# Patient Record
Sex: FEMALE | Race: WHITE | NOT HISPANIC OR LATINO | Employment: FULL TIME | URBAN - METROPOLITAN AREA
[De-identification: names, ages, dates, MRNs, and addresses within clinical notes are randomized per-mention and may not be internally consistent; named-entity substitution may affect disease eponyms.]

---

## 2018-01-23 ENCOUNTER — HOSPITAL ENCOUNTER (EMERGENCY)
Facility: CLINIC | Age: 43
Discharge: HOME | End: 2018-01-24
Attending: EMERGENCY MEDICINE | Admitting: EMERGENCY MEDICINE
Payer: OTHER GOVERNMENT

## 2018-01-23 ENCOUNTER — APPOINTMENT (OUTPATIENT)
Dept: GENERAL RADIOLOGY | Facility: CLINIC | Age: 43
End: 2018-01-23
Attending: EMERGENCY MEDICINE
Payer: OTHER GOVERNMENT

## 2018-01-23 VITALS
OXYGEN SATURATION: 99 % | TEMPERATURE: 98.1 F | HEART RATE: 85 BPM | SYSTOLIC BLOOD PRESSURE: 137 MMHG | DIASTOLIC BLOOD PRESSURE: 100 MMHG

## 2018-01-23 DIAGNOSIS — R07.9 CHEST PAIN, UNSPECIFIED TYPE: ICD-10-CM

## 2018-01-23 LAB
ERYTHROCYTE [DISTWIDTH] IN BLOOD BY AUTOMATED COUNT: 12.5 % (ref 10–15)
HCT VFR BLD AUTO: 36.7 % (ref 35–47)
HGB BLD-MCNC: 12.9 G/DL (ref 11.7–15.7)
MCH RBC QN AUTO: 31.2 PG (ref 26.5–33)
MCHC RBC AUTO-ENTMCNC: 35.1 G/DL (ref 31.5–36.5)
MCV RBC AUTO: 89 FL (ref 78–100)
PLATELET # BLD AUTO: 216 10E9/L (ref 150–450)
RBC # BLD AUTO: 4.14 10E12/L (ref 3.8–5.2)
WBC # BLD AUTO: 7.1 10E9/L (ref 4–11)

## 2018-01-23 PROCEDURE — 84484 ASSAY OF TROPONIN QUANT: CPT | Performed by: EMERGENCY MEDICINE

## 2018-01-23 PROCEDURE — 93005 ELECTROCARDIOGRAM TRACING: CPT

## 2018-01-23 PROCEDURE — 96374 THER/PROPH/DIAG INJ IV PUSH: CPT

## 2018-01-23 PROCEDURE — 84703 CHORIONIC GONADOTROPIN ASSAY: CPT | Performed by: EMERGENCY MEDICINE

## 2018-01-23 PROCEDURE — 25000128 H RX IP 250 OP 636: Performed by: EMERGENCY MEDICINE

## 2018-01-23 PROCEDURE — 85027 COMPLETE CBC AUTOMATED: CPT | Performed by: EMERGENCY MEDICINE

## 2018-01-23 PROCEDURE — 71046 X-RAY EXAM CHEST 2 VIEWS: CPT

## 2018-01-23 PROCEDURE — 99285 EMERGENCY DEPT VISIT HI MDM: CPT | Mod: 25

## 2018-01-23 PROCEDURE — 80048 BASIC METABOLIC PNL TOTAL CA: CPT | Performed by: EMERGENCY MEDICINE

## 2018-01-23 RX ORDER — KETOROLAC TROMETHAMINE 15 MG/ML
15 INJECTION, SOLUTION INTRAMUSCULAR; INTRAVENOUS ONCE
Status: COMPLETED | OUTPATIENT
Start: 2018-01-23 | End: 2018-01-23

## 2018-01-23 RX ADMIN — KETOROLAC TROMETHAMINE 15 MG: 15 INJECTION, SOLUTION INTRAMUSCULAR; INTRAVENOUS at 23:52

## 2018-01-23 ASSESSMENT — ENCOUNTER SYMPTOMS
SHORTNESS OF BREATH: 0
NAUSEA: 1
VOMITING: 0

## 2018-01-23 NOTE — ED AVS SNAPSHOT
[facility] Emergency Department    [address] [place] [address]    Phone:  [phone]    Fax:  [phone]                                       Di Garcia   MRN: [id number]    Department:  [facility] Emergency Department   Date of Visit:  1/23/2018           Patient Information     Date Of Birth          1975        Your diagnoses for this visit were:     Chest pain, unspecified type        You were seen by Nick Grace MD.      Follow-up Information     Follow up with Russel Bearden Schedule an appointment as soon as possible for a visit in 2 days.    Specialty:  Internal Medicine    Contact information:    JOSHUA SALAZAR  [address] [facility] [address]  [phone]          Discharge Instructions       Discharge Instructions  Chest Pain    You have been seen today for chest pain or discomfort.  At this time, your provider has found no signs that your chest pain is due to a serious or life-threatening condition, (or you have declined more testing and/or admission to the hospital). However, sometimes there is a serious problem that does not show up right away. Your evaluation today may not be complete and you may need further testing and evaluation.     Generally, every Emergency Department visit should have a follow-up clinic visit with either a primary or a specialty clinic/provider. Please follow-up as instructed by your emergency provider today.  Return to the Emergency Department if:    Your chest pain changes, gets worse, starts to happen more often, or comes with less activity.    You are newly short of breath.    You get very weak or tired.    You pass out or faint.    You have any new symptoms, like fever, cough, numb legs, or you cough up blood.    You have anything else that worries you.    Until you follow-up with your regular provider, please do the following:    Take one aspirin daily unless you have an allergy or  are told not to by your provider.    If a stress test appointment has been made, go to the appointment.    If you have questions, contact your regular provider.    Follow-up with your regular provider/clinic as directed; this is very important.    If you were given a prescription for medicine here today, be sure to read all of the information (including the package insert) that comes with your prescription.  This will include important information about the medicine, its side effects, and any warnings that you need to know about.  The pharmacist who fills the prescription can provide more information and answer questions you may have about the medicine.  If you have questions or concerns that the pharmacist cannot address, please call or return to the Emergency Department.       Remember that you can always come back to the Emergency Department if you are not able to see your regular provider in the amount of time listed above, if you get any new symptoms, or if there is anything that worries you.      24 Hour Appointment Hotline       To make an appointment at any [facility], call [phone] ([phone]). If you don't have a family doctor or clinic, we will help you find one. [facility] clinics are conveniently located to serve the needs of you and your family.             Review of your medicines      Notice     You have not been prescribed any medications.            Procedures and tests performed during your visit     Basic metabolic panel (BMP)    CBC (platelets, no diff)    Chest XR,  PA & LAT    EKG 12 lead    HCG QUALitative pregnancy (blood)    Peripheral IV catheter    Troponin I      Orders Needing Specimen Collection     None      Pending Results     Date and Time Order Name Status Description    1/23/2018 2330 Chest XR,  PA & LAT Preliminary     1/23/2018 2326 EKG 12 lead Preliminary             Pending Culture Results     No orders found for last 3 day(s).            Pending Results  Instructions     If you had any lab results that were not finalized at the time of your Discharge, you can call the ED Lab Result RN at [phone]. You will be contacted by this team for any positive Lab results or changes in treatment. The nurses are available 7 days a week from 10A to 6:30P.  You can leave a message 24 hours per day and they will return your call.        Test Results From Your Hospital Stay        1/23/2018 11:56 PM      Component Results     Component Value Ref Range & Units Status    WBC 7.1 4.0 - 11.0 10e9/L Final    RBC Count 4.14 3.8 - 5.2 10e12/L Final    Hemoglobin 12.9 11.7 - 15.7 g/dL Final    Hematocrit 36.7 35.0 - 47.0 % Final    MCV 89 78 - 100 fl Final    MCH 31.2 26.5 - 33.0 pg Final    MCHC 35.1 31.5 - 36.5 g/dL Final    RDW 12.5 10.0 - 15.0 % Final    Platelet Count 216 150 - 450 10e9/L Final         1/24/2018 12:14 AM      Component Results     Component Value Ref Range & Units Status    Sodium 140 133 - 144 mmol/L Final    Potassium 3.5 3.4 - 5.3 mmol/L Final    Chloride 108 94 - 109 mmol/L Final    Carbon Dioxide 27 20 - 32 mmol/L Final    Anion Gap 5 3 - 14 mmol/L Final    Glucose 109 (H) 70 - 99 mg/dL Final    Urea Nitrogen 14 7 - 30 mg/dL Final    Creatinine 0.69 0.52 - 1.04 mg/dL Final    GFR Estimate >90 >60 mL/min/1.7m2 Final    Non  GFR Calc    GFR Estimate If Black >90 >60 mL/min/1.7m2 Final    African American GFR Calc    Calcium 8.3 (L) 8.5 - 10.1 mg/dL Final         1/24/2018 12:14 AM      Component Results     Component Value Ref Range & Units Status    Troponin I ES <0.015 0.000 - 0.045 ug/L Final    The 99th percentile for upper reference range is 0.045 ug/L.  Troponin values   in the range of 0.045 - 0.120 ug/L may be associated with risks of adverse   clinical events.           1/24/2018 12:11 AM      Component Results     Component Value Ref Range & Units Status    HCG Qualitative Serum Negative NEG^Negative Final    This test is for screening  purposes.  Results should be interpreted along with   the clinical picture.  Confirmation testing is available if warranted by   ordering [id number], HCG Quantitative Pregnancy.           1/24/2018 12:30 AM      Narrative     XR CHEST 2 VW  1/24/2018 12:04 AM     INDICATION: Chest pain.    COMPARISON: 3/9/2016.        Impression     IMPRESSION: No infiltrates or other acute findings. Heart size is  within normal limits.                Clinical Quality Measure: Blood Pressure Screening     Your blood pressure was checked while you were in the emergency department today. The last reading we obtained was  BP: (!) 137/100 . Please read the guidelines below about what these numbers mean and what you should do about them.  If your systolic blood pressure (the top number) is less than 120 and your diastolic blood pressure (the bottom number) is less than 80, then your blood pressure is normal. There is nothing more that you need to do about it.  If your systolic blood pressure (the top number) is 120-139 or your diastolic blood pressure (the bottom number) is 80-89, your blood pressure may be higher than it should be. You should have your blood pressure rechecked within a year by a primary care provider.  If your systolic blood pressure (the top number) is 140 or greater or your diastolic blood pressure (the bottom number) is 90 or greater, you may have high blood pressure. High blood pressure is treatable, but if left untreated over time it can put you at risk for heart attack, stroke, or kidney failure. You should have your blood pressure rechecked by a primary care provider within the next 4 weeks.  If your provider in the emergency department today gave you specific instructions to follow-up with your doctor or provider even sooner than that, you should follow that instruction and not wait for up to 4 weeks for your follow-up visit.        Thank you for choosing [facility]       Thank you for choosing [facility] for your care.  "Our goal is always to provide you with excellent care. Hearing back from our patients is one way we can continue to improve our services. Please take a few minutes to complete the written survey that you may receive in the mail after you visit with us. Thank you!        [employer] Information     [employer] lets you send messages to your doctor, view your test results, renew your prescriptions, schedule appointments and more. To sign up, go to [email or web address] . Click on \"Log in\" on the left side of the screen, which will take you to the Welcome page. Then click on \"Sign up Now\" on the right side of the page.     You will be asked to enter the access code listed below, as well as some personal information. Please follow the directions to create your username and password.     Your access code is: [email or web address]  Expires: 2018 12:39 AM     Your access code will  in 90 days. If you need help or a new code, please call your [facility] clinic or [phone].        Care EveryWhere ID     This is your Care EveryWhere ID. This could be used by other organizations to access your [facility] medical records  [id number]        Equal Access to Services     JOHNNA VERAS : Alejo Chatman, tye agustin, nicho call, parviz fishman. So [facility] [phone].    ATENCIÓN: Si habla español, tiene a redd disposición servicios gratuitos de asistencia lingüística. Osmel al [phone].    We comply with applicable federal civil rights laws and [place] laws. We do not discriminate on the basis of race, color, national origin, age, disability, sex, sexual orientation, or gender identity.            After Visit Summary       This is your record. Keep this with you and show to your community pharmacist(s) and doctor(s) at your next visit.                  "

## 2018-01-23 NOTE — ED AVS SNAPSHOT
[facility] Emergency Department    [address]    [facility] [address]    Phone:  [phone]    Fax:  [phone]                                       Di Garcia   MRN: [id number]    Department:  [facility] Emergency Department   Date of Visit:  1/23/2018           After Visit Summary Signature Page     I have received my discharge instructions, and my questions have been answered. I have discussed any challenges I see with this plan with the nurse or doctor.    ..........................................................................................................................................  Patient/Patient Representative Signature      ..........................................................................................................................................  Patient Representative Print Name and Relationship to Patient    ..................................................               ................................................  Date                                            Time    ..........................................................................................................................................  Reviewed by Signature/Title    ...................................................              ..............................................  Date                                                            Time

## 2018-01-24 LAB
ANION GAP SERPL CALCULATED.3IONS-SCNC: 5 MMOL/L (ref 3–14)
BUN SERPL-MCNC: 14 MG/DL (ref 7–30)
CALCIUM SERPL-MCNC: 8.3 MG/DL (ref 8.5–10.1)
CHLORIDE SERPL-SCNC: 108 MMOL/L (ref 94–109)
CO2 SERPL-SCNC: 27 MMOL/L (ref 20–32)
CREAT SERPL-MCNC: 0.69 MG/DL (ref 0.52–1.04)
GFR SERPL CREATININE-BSD FRML MDRD: >90 ML/MIN/1.7M2
GLUCOSE SERPL-MCNC: 109 MG/DL (ref 70–99)
HCG SERPL QL: NEGATIVE
INTERPRETATION ECG - MUSE: NORMAL
POTASSIUM SERPL-SCNC: 3.5 MMOL/L (ref 3.4–5.3)
SODIUM SERPL-SCNC: 140 MMOL/L (ref 133–144)
TROPONIN I SERPL-MCNC: <0.015 UG/L (ref 0–0.04)

## 2018-01-24 NOTE — ED NOTES
"Pt presents to ED with complaints of chest pain. Pt states she has been having \"twinges\" over the last few days but in the last few hours it has turned into a midsternal pressure with twinges to her left chest. Pt states it's worse with going up and down stairs. Hx of anxiety and pt took ativan about 45 minutes ago with no improvement. ABCs intact. A&Ox4.   "

## 2018-01-24 NOTE — ED PROVIDER NOTES
History     Chief Complaint:  Chest Pain    HPI   Di Garcia is a 42 year old female with a history of anxiety who presents to the emergency department for evaluation of chest pain. The patient reports that she was generally feeling well today, though this evening at approximately 2030 while watching TV, she developed substernal, reproducible chest discomfort. This discomfort was worsened by positional changes and while going up the stairs, though felt somewhat alleviated with a heating pad. The patient was concerned that this may represent anxiety and took a tablet of Ativan, with little relief. Her persistent discomfort since onset was concerning to her and prompted her ED visit.     The patient feels slightly nauseated as well with these symptoms, though has not vomited. She denies any recent shortness of breath, hemoptysis, or personal history of malignancy. The patient's last menstrual period was last week, which was normal timing for her, though was slightly heavier than normal.     Cardiac/PE/DVT Risk Factors:  The patient has no history of hypertension, hyperlipidemia, diabetes, or smoking. She reports no family history of CAD or MI at an early age. The patient denies any personal or familial history of PE, DVT, or clotting disorder. The patient reports no recent travel, surgery, or other immobilizations.     Allergies:  NKDA     Medications:    Prozac    Past Medical History:    Depression  Anxiety    Past Surgical History:    [place] teeth removal   Family / Social History:    No past pertinent family history.    Social History:  Presents with her son.   Negative for tobacco use.  Positive for alcohol use, rarely.   Marital Status:  Single [1]    Review of Systems   Respiratory: Negative for shortness of breath.    Cardiovascular: Positive for chest pain.   Gastrointestinal: Positive for nausea. Negative for vomiting.   All other systems reviewed and are negative.    Physical Exam     Patient  Vitals for the past 24 hrs:   BP Temp Temp src Pulse Heart Rate SpO2   01/23/18 2321 (!) 137/100 98.1  F (36.7  C) Oral 85 85 99 %       Physical Exam    General:   Pleasant, age appropriate.  HEENT:    Oropharynx is moist, without lesions or trismus.  Eyes:    Conjunctiva normal  Neck:    Supple, no meningismus.     CV:     Regular rate and rhythm.      No murmurs, rubs or gallops.       No JVD or unilateral leg swelling.       2+ radial pulses bilateral.       No lower extremity edema.  PULM:    Clear to auscultation bilateral.       No respiratory distress.      Good air exchange.     No rales or wheezing.     Chest wall is tender to touch and reproduces her pain.  ABD:    Soft, non-tender, non-distended.       No pulsatile masses.       No rebound, guarding or rigidity.  MSK:     No gross deformity to all four extremities.   LYMPH:   No cervical lymphadenopathy.  NEURO:   Alert. Good muscle tone, no atrophy.  Skin:    Warm, dry and intact.    Psych:    Mood is good and affect is appropriate.      HEART SCORE    History:   Highly suspicious          Moderately suspicious         Slightly suspicious     0    ECG:   Significant ST depression         Non-specific repolarization abnormality       Normal       0    Age:   > 65            45-65            < 45       0    Risk Factors:  3 or more          1-2            No risk factors      0    Troponin:   > 3x normal limit     1-3x normal limit     < normal limit      0    Total:           0      Emergency Department Course   ECG:  Indication: Chest Pain  Time: 2323  Vent. Rate 85 bpm. DE interval 136. QRS duration 90. QT/QTc 378/449. P-R-T axis 46 -5 34.  Normal sinus rhythm. Normal ECG. No significant change compared to EKG dated 3/9/2016. Read time: 2330    Imaging:  Radiographic findings were communicated with the patient who voiced understanding of the findings.    XR Chest 2 views:   No infiltrates or other acute findings. Heart size is  within normal limits. As  per radiology.     Laboratory:  CBC: WBC: 7.1, HGB: 12.9, PLT: 216  BMP: Glucose 109 (H), Calcium 8.3 (L), o/w WNL (Creatinine: 0.69)    2344 Troponin: <0.015    HCG qualitative pregnancy (blood): Negative     Interventions:  2352 Toradol, 15 mg, IV injection    Emergency Department Course:  Nursing notes and vitals reviewed. 2324 I performed an exam of the patient as documented above.     EKG obtained in the ED, see results above.     IV inserted. Medicine administered as documented above. Blood drawn. This was sent to the lab for further testing, results above.    The patient was sent for a Chest XR while in the emergency department, findings above.     0034 I rechecked the patient and discussed the results of her workup thus far.     Findings and plan explained to the Patient. Patient discharged home with instructions regarding supportive care, medications, and reasons to return. The importance of close follow-up was reviewed.     I personally reviewed the laboratory results with the Patient and answered all related questions prior to discharge.     Impression & Plan      Medical Decision Making:  Patient was seen in the ED with complaints of chest pain.  My clinical gestalt for underlying coronary artery disease is low.  EKG reveals no concerning findings for ischemia, arrhythmia, myocarditis or pericarditis.  Troponin is within normal limits.  The patient has a HEART score of 0 placing their risk of major adverse cardiac event at 1.7% at 6 weeks.  The patient feels comfortable with discharge home in which I have recommended urgent follow up with primary care physician in 2-3 days.    I also considered alternative forms of pathology as a cause of the patient's chest pain today.  Patient as no concerning risk factors for pulmonary embolus, is very low risk based on clinical gestalt and PERC negative thus I did not recommend D-dimer testing or CT scan of the chest.  History and exam is not consistent with aortic  aneurysm, dissection or esophageal rupture to necessitate CT scan.  Given the reproducible chest wall tenderness that is most consistent with musculoskeletal source such as costochondritis.  Patient understands the need for immediate return for increased pain, dyspnea, syncope, failure to improve, worsening symptoms or any other concerns for further evaluation.    Diagnosis:    ICD-10-CM   1. Chest pain, unspecified type R07.9       Disposition:  discharged to home    IThais, am serving as a scribe on 1/23/2018 at 11:24 PM to personally document services performed by Nick Grace MD based on my observations and the provider's statements to me.     Thais Cardoza  1/23/2018   [facility] EMERGENCY DEPARTMENT       Nick Grace MD  01/24/18 0049

## 2018-12-30 ENCOUNTER — APPOINTMENT (OUTPATIENT)
Dept: GENERAL RADIOLOGY | Facility: CLINIC | Age: 43
End: 2018-12-30
Attending: EMERGENCY MEDICINE
Payer: OTHER GOVERNMENT

## 2018-12-30 ENCOUNTER — HOSPITAL ENCOUNTER (EMERGENCY)
Facility: CLINIC | Age: 43
Discharge: HOME | End: 2018-12-30
Attending: EMERGENCY MEDICINE | Admitting: EMERGENCY MEDICINE
Payer: OTHER GOVERNMENT

## 2018-12-30 VITALS
BODY MASS INDEX: 30.73 KG/M2 | HEIGHT: 64 IN | SYSTOLIC BLOOD PRESSURE: 119 MMHG | TEMPERATURE: 99 F | HEART RATE: 81 BPM | OXYGEN SATURATION: 98 % | DIASTOLIC BLOOD PRESSURE: 79 MMHG | RESPIRATION RATE: 16 BRPM | WEIGHT: 180 LBS

## 2018-12-30 DIAGNOSIS — R07.89 CHEST WALL PAIN: ICD-10-CM

## 2018-12-30 DIAGNOSIS — R07.9 ACUTE CHEST PAIN: ICD-10-CM

## 2018-12-30 LAB
ANION GAP SERPL CALCULATED.3IONS-SCNC: 8 MMOL/L (ref 3–14)
BASOPHILS # BLD AUTO: 0 10E9/L (ref 0–0.2)
BASOPHILS NFR BLD AUTO: 0.2 %
BUN SERPL-MCNC: 10 MG/DL (ref 7–30)
CALCIUM SERPL-MCNC: 8.1 MG/DL (ref 8.5–10.1)
CHLORIDE SERPL-SCNC: 108 MMOL/L (ref 94–109)
CO2 SERPL-SCNC: 22 MMOL/L (ref 20–32)
CREAT SERPL-MCNC: 0.56 MG/DL (ref 0.52–1.04)
D DIMER PPP FEU-MCNC: 0.3 UG/ML FEU (ref 0–0.5)
DIFFERENTIAL METHOD BLD: NORMAL
EOSINOPHIL # BLD AUTO: 0.1 10E9/L (ref 0–0.7)
EOSINOPHIL NFR BLD AUTO: 0.6 %
ERYTHROCYTE [DISTWIDTH] IN BLOOD BY AUTOMATED COUNT: 13.2 % (ref 10–15)
GFR SERPL CREATININE-BSD FRML MDRD: >90 ML/MIN/{1.73_M2}
GLUCOSE SERPL-MCNC: 98 MG/DL (ref 70–99)
HCT VFR BLD AUTO: 37.2 % (ref 35–47)
HGB BLD-MCNC: 13.1 G/DL (ref 11.7–15.7)
IMM GRANULOCYTES # BLD: 0.1 10E9/L (ref 0–0.4)
IMM GRANULOCYTES NFR BLD: 0.6 %
INTERPRETATION ECG - MUSE: NORMAL
LYMPHOCYTES # BLD AUTO: 1.5 10E9/L (ref 0.8–5.3)
LYMPHOCYTES NFR BLD AUTO: 17.8 %
MCH RBC QN AUTO: 30.6 PG (ref 26.5–33)
MCHC RBC AUTO-ENTMCNC: 35.2 G/DL (ref 31.5–36.5)
MCV RBC AUTO: 87 FL (ref 78–100)
MONOCYTES # BLD AUTO: 0.4 10E9/L (ref 0–1.3)
MONOCYTES NFR BLD AUTO: 4.7 %
NEUTROPHILS # BLD AUTO: 6.5 10E9/L (ref 1.6–8.3)
NEUTROPHILS NFR BLD AUTO: 76.1 %
NRBC # BLD AUTO: 0 10*3/UL
NRBC BLD AUTO-RTO: 0 /100
PLATELET # BLD AUTO: 203 10E9/L (ref 150–450)
POTASSIUM SERPL-SCNC: 3.9 MMOL/L (ref 3.4–5.3)
RBC # BLD AUTO: 4.28 10E12/L (ref 3.8–5.2)
SODIUM SERPL-SCNC: 138 MMOL/L (ref 133–144)
TROPONIN I SERPL-MCNC: <0.015 UG/L (ref 0–0.04)
WBC # BLD AUTO: 8.5 10E9/L (ref 4–11)

## 2018-12-30 PROCEDURE — 93005 ELECTROCARDIOGRAM TRACING: CPT

## 2018-12-30 PROCEDURE — 96374 THER/PROPH/DIAG INJ IV PUSH: CPT

## 2018-12-30 PROCEDURE — 99285 EMERGENCY DEPT VISIT HI MDM: CPT | Mod: 25

## 2018-12-30 PROCEDURE — 25000128 H RX IP 250 OP 636: Performed by: EMERGENCY MEDICINE

## 2018-12-30 PROCEDURE — 85379 FIBRIN DEGRADATION QUANT: CPT | Performed by: EMERGENCY MEDICINE

## 2018-12-30 PROCEDURE — 80048 BASIC METABOLIC PNL TOTAL CA: CPT | Performed by: EMERGENCY MEDICINE

## 2018-12-30 PROCEDURE — 71046 X-RAY EXAM CHEST 2 VIEWS: CPT

## 2018-12-30 PROCEDURE — 85025 COMPLETE CBC W/AUTO DIFF WBC: CPT | Performed by: EMERGENCY MEDICINE

## 2018-12-30 PROCEDURE — 84484 ASSAY OF TROPONIN QUANT: CPT | Performed by: EMERGENCY MEDICINE

## 2018-12-30 RX ORDER — KETOROLAC TROMETHAMINE 15 MG/ML
15 INJECTION, SOLUTION INTRAMUSCULAR; INTRAVENOUS ONCE
Status: COMPLETED | OUTPATIENT
Start: 2018-12-30 | End: 2018-12-30

## 2018-12-30 RX ADMIN — KETOROLAC TROMETHAMINE 15 MG: 15 INJECTION, SOLUTION INTRAMUSCULAR; INTRAVENOUS at 14:40

## 2018-12-30 ASSESSMENT — ENCOUNTER SYMPTOMS
FEVER: 0
CHILLS: 0
NERVOUS/ANXIOUS: 1

## 2018-12-30 ASSESSMENT — MIFFLIN-ST. JEOR: SCORE: 1446.47

## 2018-12-30 NOTE — ED AVS SNAPSHOT
Emergency Department  [address] [place] [address]  Phone:  [phone]  Fax:  [phone]                                    Di Garcia   MRN: [id number]    Department:   Emergency Department   Date of Visit:  12/30/2018           After Visit Summary Signature Page    I have received my discharge instructions, and my questions have been answered. I have discussed any challenges I see with this plan with the nurse or doctor.    ..........................................................................................................................................  Patient/Patient Representative Signature      ..........................................................................................................................................  Patient Representative Print Name and Relationship to Patient    ..................................................               ................................................  Date                                   Time    ..........................................................................................................................................  Reviewed by Signature/Title    ...................................................              ..............................................  Date                                               Time          22EPIC Rev 08/18

## 2018-12-30 NOTE — ED PROVIDER NOTES
"  History     Chief Complaint:  chest pain       HPI   Di Garcia is a 43 year old female with a history of anxiety and palpitations who presents with chest pain. The patient states that for the last two nights she has had left sided chest pain when laying on her left side which resolved when she slept on her right side. Today around 1230, she experienced a short episode of sharp left sided chest pain and here in the ED she has no pain at rest but there is still chest pain when she takes a deep breath. She denies any fevers or chills. The patient wore a 48 hour holter monitor in the past due to palpitations and the monitor did not  any abnormal cardiac activity.        CARDIAC RISK FACTORS:  Sex:    Female  Tobacco:   Negative  Hypertension:   Negative  Hyperlipidemia:  Negative  Diabetes:   Negative  Family History:  Negative    PE/DVT RISK FACTORS:  Sex:    Female  Hormones:   Negative  Tobacco:   Negative  Cancer:   Negative  Travel:   Negative  Surgery:   Negative  Other immobilization: Negative  Personal history:  Negative  Family history:  Negative          Allergies:  Nalbuphine     Medications:    The patient is currently on no regular medications.    Past Medical History:    Palpitations  Anxiety    Past Surgical History:    [place] teeth removal    Family History:    No past pertinent family history.    Social History:  Patient presents with family  Negative for tobacco use.  Rare alcohol use     Review of Systems   Constitutional: Negative for chills and fever.   Cardiovascular: Positive for chest pain.   Psychiatric/Behavioral: The patient is nervous/anxious.    All other systems reviewed and are negative.      Physical Exam   First Vitals:  BP: 134/77  Pulse: 81  Temp: 99  F (37.2  C)  Resp: 16  Height: 162.6 cm (5' 4\")  Weight: 81.6 kg (180 lb)  SpO2: 98 %      Physical Exam  GENERAL: well developed, pleasant  HEAD: atraumatic  EYES: pupils reactive, extraocular muscles intact, " conjunctivae normal  ENT:  mucus membranes moist  NECK:  trachea midline, normal range of motion  RESPIRATORY: no tachypnea, breath sounds clear to auscultation   CVS: normal S1/S2, no murmurs, intact distal pulses. Reproducible chest wall tenderness.   ABDOMEN: soft, nontender, nondistention  MUSCULOSKELETAL: no deformities  SKIN: warm and dry, no acute rashes or ulceration  NEURO: GCS 15, cranial nerves intact, alert and oriented x3  PSYCH:  Mood/affect normal      Emergency Department Course   ECG:  Time: 1319  Vent. Rate 83 bpm. OK interval 136. QRS duration 84. QT/QTc 374/439. P-R-T axis 55 5 44. Normal sinus rhythm. Normal ECG. Read time: 1410      Imaging:  Radiographic findings were communicated with the patient who voiced understanding of the findings.  X-ray Chest, 2 views:  No active infiltrate. No change.   Result per radiology.      Laboratory:  CBC: WBC: 8.5, HGB: 13.1, PLT: 203  BMP: Calcium: 8.1 (L), o/w WNL (Creatinine: 0.56)(Glucose 98)  Troponin: <0.015  D dimer: 0.3    Interventions:  1440 - Toradol 15 mg IV    Emergency Department Course:  Nursing notes and vitals reviewed.  1410: I performed an exam of the patient as documented above.     1532: I rechecked the patient. Findings and plan explained to the Patient. Patient discharged home with instructions regarding supportive care, medications, and reasons to return. The importance of close follow-up was reviewed.       Impression & Plan    Medical Decision Making:  Patient presents with left chest pain and is reproducible symptoms.  Musculoskeletal seems to be most likely also consider acute coronary syndrome PE, anxiety GERD amongst others.  Patient was given Toradol with improvement of symptoms workup is normal.  Discussed outpatient management with her.      Diagnosis:    ICD-10-CM    1. Acute chest pain R07.9    2. Chest wall pain R07.89        Disposition:  discharged to home    Discharge Medications:     Medication List      There are no  discharge medications for this visit.       I, Isaiah Becerril, am serving as a scribe on 12/30/2018 at 3:10 PM to personally document services performed by Fran Lane MD based on my observations and the provider's statements to me.       Isaiah Becerril  12/30/2018    EMERGENCY DEPARTMENT       Fran Lane MD  12/30/18 2310

## 2018-12-30 NOTE — DISCHARGE INSTRUCTIONS
Discharge Instructions  Chest Pain    You have been seen today for chest pain or discomfort.  At this time, your doctor has found no signs that your chest pain is due to a serious or life-threatening condition, (or you have declined more testing and/or admission to the hospital). However, sometimes there is a serious problem that does not show up right away. Your evaluation today may not be complete and you may need further testing and evaluation.     You need to follow-up with your regular doctor within 3 days.    Return to the Emergency Department if:  Your chest pain changes, gets worse, starts to happen more often, or comes with less activity.  You are short of breath.  You get very weak or tired.  You pass out or faint.  You have any new symptoms, like fever, cough, numb legs, or you cough up blood.  You have anything else that worries you.    Until you follow-up with your regular doctor please do the following:  Take one aspirin daily unless you have an allergy or are told not to by your doctor.  If a stress test appointment has been made, go to the appointment.  If you have questions, contact your regular doctor.    If your doctor today has told you to follow-up with your regular doctor, it is very important that you make an appointment with your clinic and go to the appointment.  If you do not follow-up with your primary doctor, it may result in missing an important development which could result in permanent injury or disability and/or lasting pain.  If there is any problem keeping your appointment, call your doctor or return to the Emergency Department.    If you were given a prescription for medicine here today, be sure to read all of the information (including the package insert) that comes with your prescription.  This will include important information about the medicine, its side effects, and any warnings that you need to know about.  The pharmacist who fills the prescription can provide more  information and answer questions you may have about the medicine.  If you have questions or concerns that the pharmacist cannot address, please call or return to the Emergency Department.     Opioid Medication Information    Pain medications are among the most commonly prescribed medicines, so we are including this information for all our patients. If you did not receive pain medication or get a prescription for pain medicine, you can ignore it.     You may have been given a prescription for an opioid (narcotic) pain medicine and/or have received a pain medicine while here in the Emergency Department. These medicines can make you drowsy or impaired. You must not drive, operate dangerous equipment, or engage in any other dangerous activities while taking these medications. If you drive while taking these medications, you could be arrested for DUI, or driving under the influence. Do not drink any alcohol while you are taking these medications.     Opioid pain medications can cause addiction. If you have a history of chemical dependency of any type, you are at a higher risk of becoming addicted to pain medications.  Only take these prescribed medications to treat your pain when all other options have been tried. Take it for as short a time and as few doses as possible. Store your pain pills in a secure place, as they are frequently stolen and provide a dangerous opportunity for children or visitors in your house to start abusing these powerful medications. We will not replace any lost or stolen medicine.  As soon as your pain is better, you should flush all your remaining medication.     Many prescription pain medications contain Tylenol  (acetaminophen), including Vicodin , Tylenol #3 , Norco , Lortab , and Percocet .  You should not take any extra pills of Tylenol  if you are using these prescription medications or you can get very sick.  Do not ever take more than 3000 mg of acetaminophen in any 24 hour  period.    All opioids tend to cause constipation. Drink plenty of water and eat foods that have a lot of fiber, such as fruits, vegetables, prune juice, apple juice and high fiber cereal.  Take a laxative if you don?t move your bowels at least every other day. Miralax , Milk of Magnesia, Colace , or Senna  can be used to keep you regular.      Remember that you can always come back to the Emergency Department if you are not able to see your regular doctor in the amount of time listed above, if you get any new symptoms, or if there is anything that worries you.

## 2019-01-23 ENCOUNTER — OFFICE VISIT (OUTPATIENT)
Dept: URGENT CARE | Facility: URGENT CARE | Age: 44
End: 2019-01-23
Payer: OTHER GOVERNMENT

## 2019-01-23 VITALS
DIASTOLIC BLOOD PRESSURE: 80 MMHG | OXYGEN SATURATION: 98 % | SYSTOLIC BLOOD PRESSURE: 120 MMHG | TEMPERATURE: 97.7 F | WEIGHT: 184.4 LBS | BODY MASS INDEX: 31.65 KG/M2 | HEART RATE: 90 BPM

## 2019-01-23 DIAGNOSIS — J40 BRONCHITIS: Primary | ICD-10-CM

## 2019-01-23 DIAGNOSIS — R53.83 FATIGUE, UNSPECIFIED TYPE: ICD-10-CM

## 2019-01-23 LAB
FLUAV+FLUBV AG SPEC QL: NEGATIVE
FLUAV+FLUBV AG SPEC QL: NEGATIVE
SPECIMEN SOURCE: NORMAL

## 2019-01-23 PROCEDURE — 87804 INFLUENZA ASSAY W/OPTIC: CPT | Performed by: FAMILY MEDICINE

## 2019-01-23 PROCEDURE — 99203 OFFICE O/P NEW LOW 30 MIN: CPT | Performed by: PHYSICIAN ASSISTANT

## 2019-01-23 RX ORDER — AZITHROMYCIN 250 MG/1
TABLET, FILM COATED ORAL
Qty: 6 TABLET | Refills: 0 | Status: SHIPPED | OUTPATIENT
Start: 2019-01-23 | End: 2019-02-18

## 2019-01-24 NOTE — PROGRESS NOTES
SUBJECTIVE:   Di Garcia is a 43 year old female who presents to clinic today for the following health issues:    Acute Illness   Acute illness concerns: URI symptoms   Onset: 6 days    Fever: no    Chills/Sweats: no    Headache (location?): no    Sinus Pressure:no    Conjunctivitis:  no    Ear Pain: no    Rhinorrhea: YES    Congestion: YES    Sore Throat: no     Cough:yes--am and pm    Wheeze: no    Decreased Appetite: no    Nausea: no    Vomiting: no    Diarrhea:  no    Fatigue/Achiness: YES-from no sleep    Sick/Strep Exposure: no     Therapies Tried and outcome: none  Work: office setting  No history of asthma, allergies.   Nonsmoker.     ROS:  ROS otherwise negative    OBJECTIVE:                                                    /80 (BP Location: Right arm, Patient Position: Chair)   Pulse 90   Temp 97.7  F (36.5  C) (Tympanic)   Wt 83.6 kg (184 lb 6.4 oz)   SpO2 98%   BMI 31.65 kg/m    Body mass index is 31.65 kg/m .   GENERAL: alert, no distress  HENT: ear canals- normal; TMs- normal; Nose- normal; Mouth- no ulcers, no lesions, pnd; no sinus tenderness   NECK: no tenderness, no adenopathy  RESP: diminished breath sounds throughout; coarse breath sounds; no rales or rhonchi  CV: regular rates and rhythm, normal S1 S2, no S3 or S4 and no murmur, no click or rub  ABDOMEN: soft, no tenderness  SKIN: no suspicious lesions, no rashes    Diagnostic test results:  Results for orders placed or performed in visit on 01/23/19 (from the past 24 hour(s))   Influenza A/B antigen   Result Value Ref Range    Influenza A/B Agn Specimen Nasal     Influenza A Negative NEG^Negative    Influenza B Negative NEG^Negative        ASSESSMENT/PLAN:                                                    (J40) Bronchitis  (primary encounter diagnosis)  Comment: begin antibiotics. Return if symptoms persist or worsen.  Plan: azithromycin (ZITHROMAX) 250 MG tablet            (R53.83) Fatigue  Comment:   Plan: Influenza  A/B antigen            [facility]

## 2019-01-24 NOTE — PATIENT INSTRUCTIONS
Acute Bronchitis                  What is acute bronchitis?   Bronchitis is an infection of the air passages--that is, the tubes that connect the windpipe to the lungs. It causes swelling and irritation of the airways. With acute bronchitis you usually have a cough that produces phlegm and pain behind the breastbone when you breathe deeply or cough.   How does it occur?   Bronchitis often occurs with viral infections of the respiratory tract, such as colds and flu. Bronchitis may also be caused by bacterial infections. It may occur with childhood illnesses such as measles and whooping cough.   Attacks are most frequent during the winter or when the level of air pollution is high.   Infants, young children, older adults, smokers, and people with heart disease or lung disease (including asthma and allergies) are most likely to get acute bronchitis.   What are the symptoms?   Symptoms may include:   a deep cough that produces yellowish or greenish phlegm   pain behind the breastbone when you breathe deeply or cough   wheezing   feeling short of breath   fever   chills   headache   sore muscles.   How is it diagnosed?   Your healthcare provider will ask about your symptoms and examine you. You may have tests, such as:   a test of phlegm to look for bacteria   chest X-ray   blood tests.   How is it treated?   Acute bronchitis often does not require medical treatment. Resting at home and drinking plenty of fluids to keep the mucus loose may be all you need to do to get better in a few days. If your symptoms are severe or you have other health problems (such as heart or lung disease or diabetes), you may need to take antibiotics.   How long will the effects last?   Most of the time acute bronchitis clears up in a few days. Your cough may slowly get better in 1 to 2 weeks.   It may take you longer to recover if:   You are a smoker.   You live in an area where air pollution is a problem.   You have a heart  or lung disease.   You have any other continuing health problems.   How can I take care of myself?   You can help yourself by:   following the full treatment your healthcare provider recommends   using a vaporizer, humidifier, or steam from hot water to add moisture to the air   drinking plenty of liquids   taking cough medicine if recommended by your healthcare provider   resting in bed   taking aspirin or acetaminophen to reduce fever and relieve headache and muscle pain (Check with your healthcare provider before you give any medicine that contains aspirin or salicylates to a child or teen. This includes medicines like baby aspirin, some cold medicines, and Pepto Bismol. Children and teens who take aspirin are at risk for a serious illness called Reye's syndrome.)   eating healthy meals.   Call your healthcare provider if:   You have trouble breathing.   You have a fever of 101.5?F (38.6?C) or higher.   You cough up blood.   Your symptoms are getting worse instead of better.   You don't start to feel better after 3 days of treatment.   You have any symptoms that concern you.   How can I help prevent acute bronchitis?   To reduce your risk of getting a respiratory infection:   Do not smoke.   Wash your hands often, especially when you are around people with colds (upper respiratory infections).   If you have asthma or allergies, keep your symptoms under good control.   Get regular exercise.   Eat healthy foods.       Published by [employer].  This content is reviewed periodically and is subject to change as new health information becomes available. The information is intended to inform and educate and is not a replacement for medical evaluation, advice, diagnosis or treatment by a healthcare professional.   Developed by [employer].   ? 2010 [employer] and/or its affiliates. All Rights Reserved.   Copyright   [employer] 2011

## 2019-02-18 ENCOUNTER — OFFICE VISIT (OUTPATIENT)
Dept: URGENT CARE | Facility: URGENT CARE | Age: 44
End: 2019-02-18
Payer: OTHER GOVERNMENT

## 2019-02-18 VITALS
HEART RATE: 80 BPM | WEIGHT: 184 LBS | DIASTOLIC BLOOD PRESSURE: 80 MMHG | OXYGEN SATURATION: 98 % | SYSTOLIC BLOOD PRESSURE: 118 MMHG | TEMPERATURE: 98.2 F | BODY MASS INDEX: 31.41 KG/M2 | HEIGHT: 64 IN

## 2019-02-18 DIAGNOSIS — R30.0 DYSURIA: Primary | ICD-10-CM

## 2019-02-18 DIAGNOSIS — J02.9 PHARYNGITIS, UNSPECIFIED ETIOLOGY: ICD-10-CM

## 2019-02-18 LAB
ALBUMIN UR-MCNC: NEGATIVE MG/DL
APPEARANCE UR: CLEAR
BILIRUB UR QL STRIP: NEGATIVE
COLOR UR AUTO: YELLOW
DEPRECATED S PYO AG THROAT QL EIA: NORMAL
GLUCOSE UR STRIP-MCNC: NEGATIVE MG/DL
HGB UR QL STRIP: NEGATIVE
KETONES UR STRIP-MCNC: NEGATIVE MG/DL
LEUKOCYTE ESTERASE UR QL STRIP: NEGATIVE
NITRATE UR QL: NEGATIVE
PH UR STRIP: 7 PH (ref 5–7)
SOURCE: NORMAL
SP GR UR STRIP: 1.02 (ref 1–1.03)
SPECIMEN SOURCE: NORMAL
UROBILINOGEN UR STRIP-ACNC: 1 EU/DL (ref 0.2–1)

## 2019-02-18 PROCEDURE — 81003 URINALYSIS AUTO W/O SCOPE: CPT | Performed by: PHYSICIAN ASSISTANT

## 2019-02-18 PROCEDURE — 87880 STREP A ASSAY W/OPTIC: CPT | Performed by: PHYSICIAN ASSISTANT

## 2019-02-18 PROCEDURE — 87081 CULTURE SCREEN ONLY: CPT | Performed by: PHYSICIAN ASSISTANT

## 2019-02-18 PROCEDURE — 99214 OFFICE O/P EST MOD 30 MIN: CPT | Performed by: PHYSICIAN ASSISTANT

## 2019-02-18 ASSESSMENT — MIFFLIN-ST. JEOR: SCORE: 1464.62

## 2019-02-19 LAB
BACTERIA SPEC CULT: NORMAL
SPECIMEN SOURCE: NORMAL

## 2019-02-19 NOTE — PROGRESS NOTES
"SUBJECTIVE:   Di Garcia is a 43 year old female who  presents today for a possible UTI. Symptoms of dysuria, frequency, burning and suprapubic pain and pressure have been going on for 10day(s).  Hematuria no.  Sx very mild and seems to come and go randomly.  Wants to make sure that no UTI..  There is no history of fever, chills, nausea or vomiting.  No history of vaginal or penile discharge. This patient does  have a history of urinary tract infections. Patient denies rigors, flank pain, temperature > 101 degrees F., Vomiting, significant nausea or diarrhea, taking Coumadin and GFR less than 30 within the last year or vaginal discharge, vaginal odor, vaginal itching and dyspareunia (pain in labia/pelvis)     Also with mild ST  And HA.  No fevers.  Son just diagnosed with strep and wants to be checked.      PMH hx of anxiety palpitations     MED no daily med    History reviewed. No pertinent past medical history.  No current outpatient medications on file.     Social History     Tobacco Use     Smoking status: Never Smoker     Smokeless tobacco: Never Used   Substance Use Topics     Alcohol use: Yes     Comment: rare       ROS:   Review of systems negative except as stated above.    OBJECTIVE:  /80 (BP Location: Right arm, Patient Position: Chair, Cuff Size: Adult Regular)   Pulse 80   Temp 98.2  F (36.8  C) (Oral)   Ht 1.626 m (5' 4\")   Wt 83.5 kg (184 lb)   LMP 01/27/2019   SpO2 98%   Breastfeeding? No   BMI 31.58 kg/m    GENERAL APPEARANCE: healthy, alert and no distress  HENT: TM's normal bilaterally, nose and mouth without erythema, ulcers or lesions and oral mucous membranes moist, no erythema noted  RESP: lungs clear to auscultation - no rales, rhonchi or wheezes  CV: regular rates and rhythm, normal S1 S2, no murmur noted  ABDOMEN:  soft, nontender, no HSM or masses and bowel sounds normal  BACK: No CVA tenderness  SKIN: no suspicious lesions or rashes  NEURO: Normal strength and tone, " sensory exam grossly normal,  normal speech and mentation    Results for orders placed or performed in visit on 02/18/19   *UA reflex to Microscopic and Culture ([facility] (except [place] and [place])   Result Value Ref Range    Color Urine Yellow     Appearance Urine Clear     Glucose Urine Negative NEG^Negative mg/dL    Bilirubin Urine Negative NEG^Negative    Ketones Urine Negative NEG^Negative mg/dL    Specific Gravity Urine 1.020 1.003 - 1.035    Blood Urine Negative NEG^Negative    pH Urine 7.0 5.0 - 7.0 pH    Protein Albumin Urine Negative NEG^Negative mg/dL    Urobilinogen Urine 1.0 0.2 - 1.0 EU/dL    Nitrite Urine Negative NEG^Negative    Leukocyte Esterase Urine Negative NEG^Negative    Source Midstream Urine    Strep, Rapid Screen   Result Value Ref Range    Specimen Description Throat     Rapid Strep A Screen       NEGATIVE: No Group A streptococcal antigen detected by immunoassay, await culture report.   Beta strep group A culture   Result Value Ref Range    Specimen Description Throat     Culture Micro No beta hemolytic Streptococcus Group A isolated      assessment/plan:  (R30.0) Dysuria  (primary encounter diagnosis)  Comment:   Plan: *UA reflex to Microscopic and Culture ([place]         and [facility] (except [place] and         [place]), Beta strep group A culture          UA clear and no signs of infection  Denies vaginal sx and declines wet prep or GC testing.  Push fluids and to Follow-up with PCP as needed if sx worsen or new sx develop./      (J02.9) Pharyngitis, unspecified etiology  Comment:   Plan: Strep, Rapid Screen, Beta strep group A culture        Negative and supportive care

## 2019-03-13 ENCOUNTER — HOSPITAL ENCOUNTER (EMERGENCY)
Facility: CLINIC | Age: 44
Discharge: HOME | End: 2019-03-13
Payer: OTHER GOVERNMENT

## 2019-04-13 ENCOUNTER — APPOINTMENT (OUTPATIENT)
Dept: ULTRASOUND IMAGING | Facility: CLINIC | Age: 44
End: 2019-04-13
Attending: EMERGENCY MEDICINE
Payer: OTHER GOVERNMENT

## 2019-04-13 ENCOUNTER — HOSPITAL ENCOUNTER (EMERGENCY)
Facility: CLINIC | Age: 44
Discharge: HOME | End: 2019-04-13
Attending: EMERGENCY MEDICINE | Admitting: EMERGENCY MEDICINE
Payer: OTHER GOVERNMENT

## 2019-04-13 ENCOUNTER — APPOINTMENT (OUTPATIENT)
Dept: CT IMAGING | Facility: CLINIC | Age: 44
End: 2019-04-13
Attending: EMERGENCY MEDICINE
Payer: OTHER GOVERNMENT

## 2019-04-13 VITALS
RESPIRATION RATE: 18 BRPM | SYSTOLIC BLOOD PRESSURE: 137 MMHG | HEART RATE: 86 BPM | TEMPERATURE: 97.8 F | DIASTOLIC BLOOD PRESSURE: 90 MMHG | OXYGEN SATURATION: 98 %

## 2019-04-13 DIAGNOSIS — R10.9 ACUTE ABDOMINAL PAIN: ICD-10-CM

## 2019-04-13 DIAGNOSIS — N83.209 CYST OF OVARY, UNSPECIFIED LATERALITY: ICD-10-CM

## 2019-04-13 DIAGNOSIS — R16.1 SPLENOMEGALY: ICD-10-CM

## 2019-04-13 LAB
ALBUMIN SERPL-MCNC: 3.9 G/DL (ref 3.4–5)
ALBUMIN UR-MCNC: NEGATIVE MG/DL
ALP SERPL-CCNC: 75 U/L (ref 40–150)
ALT SERPL W P-5'-P-CCNC: 39 U/L (ref 0–50)
ANION GAP SERPL CALCULATED.3IONS-SCNC: 4 MMOL/L (ref 3–14)
APPEARANCE UR: CLEAR
AST SERPL W P-5'-P-CCNC: 24 U/L (ref 0–45)
BASOPHILS # BLD AUTO: 0.1 10E9/L (ref 0–0.2)
BASOPHILS NFR BLD AUTO: 0.7 %
BILIRUB SERPL-MCNC: 0.7 MG/DL (ref 0.2–1.3)
BILIRUB UR QL STRIP: NEGATIVE
BUN SERPL-MCNC: 11 MG/DL (ref 7–30)
CALCIUM SERPL-MCNC: 8.6 MG/DL (ref 8.5–10.1)
CHLORIDE SERPL-SCNC: 107 MMOL/L (ref 94–109)
CO2 SERPL-SCNC: 27 MMOL/L (ref 20–32)
COLOR UR AUTO: NORMAL
CREAT SERPL-MCNC: 0.65 MG/DL (ref 0.52–1.04)
DIFFERENTIAL METHOD BLD: NORMAL
EOSINOPHIL # BLD AUTO: 0.1 10E9/L (ref 0–0.7)
EOSINOPHIL NFR BLD AUTO: 1.6 %
ERYTHROCYTE [DISTWIDTH] IN BLOOD BY AUTOMATED COUNT: 13.1 % (ref 10–15)
GFR SERPL CREATININE-BSD FRML MDRD: >90 ML/MIN/{1.73_M2}
GLUCOSE SERPL-MCNC: 98 MG/DL (ref 70–99)
GLUCOSE UR STRIP-MCNC: NEGATIVE MG/DL
HCG SERPL QL: NEGATIVE
HCT VFR BLD AUTO: 41.2 % (ref 35–47)
HGB BLD-MCNC: 14.1 G/DL (ref 11.7–15.7)
HGB UR QL STRIP: NEGATIVE
IMM GRANULOCYTES # BLD: 0 10E9/L (ref 0–0.4)
IMM GRANULOCYTES NFR BLD: 0.3 %
KETONES UR STRIP-MCNC: NEGATIVE MG/DL
LEUKOCYTE ESTERASE UR QL STRIP: NEGATIVE
LIPASE SERPL-CCNC: 145 U/L (ref 73–393)
LYMPHOCYTES # BLD AUTO: 2.9 10E9/L (ref 0.8–5.3)
LYMPHOCYTES NFR BLD AUTO: 33.5 %
MCH RBC QN AUTO: 31.1 PG (ref 26.5–33)
MCHC RBC AUTO-ENTMCNC: 34.2 G/DL (ref 31.5–36.5)
MCV RBC AUTO: 91 FL (ref 78–100)
MONOCYTES # BLD AUTO: 0.7 10E9/L (ref 0–1.3)
MONOCYTES NFR BLD AUTO: 8.2 %
NEUTROPHILS # BLD AUTO: 4.8 10E9/L (ref 1.6–8.3)
NEUTROPHILS NFR BLD AUTO: 55.7 %
NITRATE UR QL: NEGATIVE
NRBC # BLD AUTO: 0 10*3/UL
NRBC BLD AUTO-RTO: 0 /100
PH UR STRIP: 6 PH (ref 5–7)
PLATELET # BLD AUTO: 252 10E9/L (ref 150–450)
POTASSIUM SERPL-SCNC: 3.5 MMOL/L (ref 3.4–5.3)
PROT SERPL-MCNC: 7.5 G/DL (ref 6.8–8.8)
RBC # BLD AUTO: 4.54 10E12/L (ref 3.8–5.2)
RBC #/AREA URNS AUTO: <1 /HPF (ref 0–2)
SODIUM SERPL-SCNC: 138 MMOL/L (ref 133–144)
SOURCE: NORMAL
SP GR UR STRIP: 1 (ref 1–1.03)
SQUAMOUS #/AREA URNS AUTO: <1 /HPF (ref 0–1)
UROBILINOGEN UR STRIP-MCNC: NORMAL MG/DL (ref 0–2)
WBC # BLD AUTO: 8.7 10E9/L (ref 4–11)
WBC #/AREA URNS AUTO: 1 /HPF (ref 0–5)

## 2019-04-13 PROCEDURE — 76775 US EXAM ABDO BACK WALL LIM: CPT

## 2019-04-13 PROCEDURE — 81001 URINALYSIS AUTO W/SCOPE: CPT | Performed by: EMERGENCY MEDICINE

## 2019-04-13 PROCEDURE — 96374 THER/PROPH/DIAG INJ IV PUSH: CPT | Mod: 59

## 2019-04-13 PROCEDURE — 99285 EMERGENCY DEPT VISIT HI MDM: CPT | Mod: 25

## 2019-04-13 PROCEDURE — 74177 CT ABD & PELVIS W/CONTRAST: CPT

## 2019-04-13 PROCEDURE — 84703 CHORIONIC GONADOTROPIN ASSAY: CPT | Performed by: EMERGENCY MEDICINE

## 2019-04-13 PROCEDURE — 85025 COMPLETE CBC W/AUTO DIFF WBC: CPT | Performed by: EMERGENCY MEDICINE

## 2019-04-13 PROCEDURE — 93005 ELECTROCARDIOGRAM TRACING: CPT

## 2019-04-13 PROCEDURE — 96361 HYDRATE IV INFUSION ADD-ON: CPT

## 2019-04-13 PROCEDURE — 80053 COMPREHEN METABOLIC PANEL: CPT | Performed by: EMERGENCY MEDICINE

## 2019-04-13 PROCEDURE — 83690 ASSAY OF LIPASE: CPT | Performed by: EMERGENCY MEDICINE

## 2019-04-13 PROCEDURE — 25000128 H RX IP 250 OP 636: Performed by: EMERGENCY MEDICINE

## 2019-04-13 PROCEDURE — 93976 VASCULAR STUDY: CPT

## 2019-04-13 PROCEDURE — 96375 TX/PRO/DX INJ NEW DRUG ADDON: CPT

## 2019-04-13 RX ORDER — ONDANSETRON 4 MG/1
4 TABLET, ORALLY DISINTEGRATING ORAL EVERY 8 HOURS PRN
Qty: 10 TABLET | Refills: 0 | Status: SHIPPED | OUTPATIENT
Start: 2019-04-13 | End: 2019-04-16

## 2019-04-13 RX ORDER — KETOROLAC TROMETHAMINE 15 MG/ML
10 INJECTION, SOLUTION INTRAMUSCULAR; INTRAVENOUS ONCE
Status: COMPLETED | OUTPATIENT
Start: 2019-04-13 | End: 2019-04-13

## 2019-04-13 RX ORDER — ONDANSETRON 2 MG/ML
4 INJECTION INTRAMUSCULAR; INTRAVENOUS EVERY 30 MIN PRN
Status: DISCONTINUED | OUTPATIENT
Start: 2019-04-13 | End: 2019-04-13 | Stop reason: HOSPADM

## 2019-04-13 RX ORDER — IOPAMIDOL 755 MG/ML
500 INJECTION, SOLUTION INTRAVASCULAR ONCE
Status: COMPLETED | OUTPATIENT
Start: 2019-04-13 | End: 2019-04-13

## 2019-04-13 RX ORDER — SODIUM CHLORIDE 9 MG/ML
1000 INJECTION, SOLUTION INTRAVENOUS CONTINUOUS
Status: DISCONTINUED | OUTPATIENT
Start: 2019-04-13 | End: 2019-04-13 | Stop reason: HOSPADM

## 2019-04-13 RX ADMIN — SODIUM CHLORIDE 1000 ML: 9 INJECTION, SOLUTION INTRAVENOUS at 02:38

## 2019-04-13 RX ADMIN — IOPAMIDOL 92 ML: 755 INJECTION, SOLUTION INTRAVENOUS at 04:48

## 2019-04-13 RX ADMIN — ONDANSETRON 4 MG: 2 INJECTION INTRAMUSCULAR; INTRAVENOUS at 02:38

## 2019-04-13 RX ADMIN — KETOROLAC TROMETHAMINE 10 MG: 15 INJECTION, SOLUTION INTRAMUSCULAR; INTRAVENOUS at 02:39

## 2019-04-13 ASSESSMENT — ENCOUNTER SYMPTOMS
ABDOMINAL PAIN: 1
FLANK PAIN: 0
FEVER: 0
VOMITING: 0
DYSURIA: 0
DIARRHEA: 0
CONSTIPATION: 0
NAUSEA: 1
HEMATURIA: 0

## 2019-04-13 NOTE — ED PROVIDER NOTES
"  History     Chief Complaint:  Abdominal Pain    HPI   Di Garcia is a 43 year old, otherwise healthy female who presents for evaluation of left sided abdominal pain. She reports intermittent left back/side pain for the last few weeks; she has seen her primary provider about this issue and they recommend she follow-up with physical therapy. This morning, she awoke to a worse left side pain that was now radiating down the left side of abdomen and into her leg. With these new symptoms, she decided to present for evaluation. Here, she describes the sensation as a \"burning pain\" that come in progressively worsening waves. She also reports nausea, but denies any vomiting, abnormal stools, dysuria, hematuria, vaginal bleeding, or abnormal vaginal discharge. She has no prior abdominal surgeries. She also has no history of blood clots, be it in the legs or lungs, nor is she currently on hormone therapy.     Allergies:  Nalbuphine    Medications:    The patient is currently on no regular medications.    Past Medical History:    The patient denies any significant past medical history.    Past Surgical History:    Oral surgery    Family History:    No past pertinent family history.    Social History:  Marital Status:  Single [1]  Negative for tobacco use.  Positive for alcohol use. Comment: Rare.      Review of Systems   Constitutional: Negative for fever.   Gastrointestinal: Positive for abdominal pain and nausea. Negative for constipation, diarrhea and vomiting.   Genitourinary: Negative for dysuria, flank pain, hematuria, vaginal bleeding and vaginal discharge.   All other systems reviewed and are negative.        Physical Exam     Patient Vitals for the past 24 hrs:   BP Temp Temp src Pulse Heart Rate Resp SpO2   04/13/19 0157 (!) 163/104 97.8  F (36.6  C) Oral 98 98 18 99 %      Physical Exam  Constitutional:  Oriented to person, place, and time. Well-appearing.   HENT:   Head:    Normocephalic.   Mouth/Throat: "   Oropharynx is clear and moist.   Eyes:    EOM are normal. Pupils are equal, round, and reactive to light.   Neck:    Neck supple.   Cardiovascular:  Normal rate, regular rhythm and normal heart sounds.      Exam reveals no gallop and no friction rub.       No murmur heard.  Pulmonary/Chest:  Effort normal and breath sounds normal.      No respiratory distress. No wheezes. No rales.      No reproducible chest wall pain.  Abdominal:   Soft. No distension. Mild left lower quadrant tenderness. No rebound and no guarding. No pain to percussion of her flanks.   Musculoskeletal:  Normal range of motion.   Neurological:   Alert and oriented to person, place, and time.           Moves all 4 extremities spontaneously    Skin:    No rash noted. No pallor.     Emergency Department Course   ECG:  Indication: Abdominal Pain  Time: 0247  Vent. Rate 80 bpm. NC interval 138. QRS duration 90. QT/QTc 402/463. P-R-T axis 36 -11 14.    Normal sinus rhythm. Read time: 0247.    Imaging:  Radiographic findings were communicated with the patient who voiced understanding of the findings.  US Pelvic, Complete w Transvaginal & Abd/Pel Duplex Limited:  1. There is a 6.4 cm right ovarian cyst. No evidence of ovarian  torsion.  2. Probable endometrial polyp measuring up to 1.5 cm.  3. Two uterine fibroids, as per radiology.     US Renal, Limited:  Normal left kidney. No hydronephrosis, as per radiology.    CT Abdomen/Pelvis with IV contrast:   1. There is a 5.8 cm right ovarian cyst and a 2.2 cm left ovarian  cyst.  2. Mild splenomegaly.  3. Uterine fibroids.  4. No bowel obstruction or inflammation, as per radiology.     Laboratory:  CBC: WBC: 8.7, HGB: 14.1, PLT: 252  CMP: All WNL (Creatinine: 0.65)    Lipase: 145  HCG: Negative    UA with Microscopic: All WNL    Interventions:  0238 NS 1L IV   Zofran, 4 mg, IV injection  0239 Toradol, 10 mg, IV injection    Emergency Department Course:  Nursing notes and vitals reviewed.   (0209) I performed  an exam of the patient as documented above.      EKG obtained in the ED, see results above.     IV inserted. Medicine administered as documented above. Blood drawn. This was sent to the lab for further testing, results above.     The patient was sent for a renal and pelvic ultrasounds while in the emergency department, findings above.      The patient provided a urine sample here in the emergency department. This was sent for laboratory testing, findings above.     (5290) I rechecked the patient and discussed the results of her workup thus far. She reports her pain continues to persist.     She was sent for a CT abdomen/pelvis in the emergency department at this point, findings above.      (1125) I rechecked the patient and updated her on the results of her CT imaging. At this point, she is amenable to discharge.     Findings and plan explained to the Patient. Patient discharged home with instructions regarding supportive care, medications, and reasons to return. The importance of close follow-up was reviewed. The patient was prescribed Zofran.      I personally reviewed the imaging and laboratory results with the Patient and answered all related questions prior to discharge.        Impression & Plan      Medical Decision Making:  Di Garcia is a 43 year old female who came in complaining of left lower quadrant abdominal pain. Differential includes ovarian cyst, ovarian torsion, renal colic, pyelonephritis, diverticulitis, or other causes. Work up thus far was otherwise reassuring including labs and ultrasound. Symptoms could not point towards obvious renal colic. There are concerns for possible diverticulitis, therefore CT imaging was performed. There is an incidental finding of splenomegaly which is likely asymptomatic. She is aware of this as she has been told to follow-up with her primary physician in regards to it. She also has an incidental finding noted of uterine fibroids, as well as a large right  ovarian cyst and small left ovarian cyst. I suspect that her left ovarian cyst is symptomatic and the source for her pain, but this is not definite. Her pain is currently controlled after Toradol. I would highly doubt ovarian torsion. She voices understanding of the findings and plan and is safe for discharge home with follow-up with her OBGYN.     Diagnosis:    ICD-10-CM    1. Acute abdominal pain R10.9    2. Cyst of ovary, unspecified laterality N83.209    3. Splenomegaly R16.1        Disposition:  discharged to home    Discharge Medications:     Medication List      Started    ondansetron 4 MG ODT tab  Commonly known as:  ZOFRAN ODT  4 mg, Oral, EVERY 8 HOURS PRN            Scribe Disclosure:  I, Gladys Khan, am serving as a scribe on 4/13/2019 at 2:09 AM to personally document services performed by Isaiah Ortega MD based on my observations and the provider's statements to me.     Gladys Khan  4/13/2019   [facility] EMERGENCY DEPARTMENT       Isaiah Ortega MD  04/13/19 0610

## 2019-04-13 NOTE — ED TRIAGE NOTES
Left lateral abdominal pain started several weeks wrapping to the front and down to left leg. Pain worst and intermittent that wakes patient up from sleep. Nausea, no vomit. Denies any dysuria. ABCs intact.

## 2019-04-13 NOTE — DISCHARGE INSTRUCTIONS
You have a enlarge spleen. Please follow up with your doctor on this    Discharge Instructions  Abdominal Pain    Abdominal pain can be caused by many things. Your evaluation today does not show the exact cause for your pain. Your doctor today has decided that it is unlikely your pain is due to a life threatening problem, or a problem requiring surgery or hospital admission. Sometimes those problems cannot be found right away, so it is very important that you follow up as directed.  Sometimes only the changes which occur over time allow the cause of your pain to be found.    Return to the Emergency Department for a recheck in 8-12 hours if your pain continues.  If your pain gets worse, changes in location, or feels different, return to the Emergency Department right away.    ADULTS:  Return to the Emergency Department right away if:    You get an oral temperature above 102oF or as directed by your doctor.  You have blood in your stools (bright red or black, tarry stools).  You keep throwing up or can?t drink liquids.  You see blood when you throw up.  You can?t have a bowel movement or you can?t pass gas.  Your stomach gets bloated or bigger.  Your skin or the whites of your eyes look yellow.  You faint.  You have bloody, frequent or painful urination.  You have new symptoms or anything that worries you.    CHILDREN:  Return to the Emergency Department right away if your child has any of the above-listed symptoms or the following:    Pushes your hand away or screams/cries when his/her belly is touched.  You notice your child is very fussy or weak.  Your child is very tired and is too tired to eat or drink.  Your child is dehydrated.  Signs of dehydration can be:  Your infant has had no wet diapers in 4-5 hours.  Your older child has not passed urine in 6-8 hours.  Your infant or child starts to have dry mouth and lips, or no saliva or tears.    PREGNANT WOMEN:  Return to the Emergency Department right away if you  have any of the above-listed symptoms or the following:    You have bleeding, leaking fluid or passing tissue from the vagina.  You have worse pain or cramping, or pain in your shoulder or back.  You have vomiting that will not stop.  You have painful or bloody urination.  You have a temperature of 100oF or more.  Your baby is not moving as much as usual.  You faint.  You get a bad headache with or without eye problems and abdominal pain.  You have a convulsion or seizure.  You have unusual discharge from your vagina and abdominal pain.    Abdominal pain is pretty common during pregnancy.  Your pain may or may not be related to your pregnancy. You should follow-up closely with your OB doctor so they can evaluate you and your baby.  Until you follow-up with your regular doctor, do the following:     Avoid sex and do not put anything in your vagina.  Drink clear fluids.  Only take medications approved by your doctor.    MORE INFORMATION:    Appendicitis:  A possible cause of abdominal pain in any person who still has their appendix is acute appendicitis. Appendicitis is often hard to diagnose.  Testing does not always rule out early appendicitis or other causes of abdominal pain. Close follow-up with your doctor and re-evaluations may be needed to figure out the reason for your abdominal pain.    Follow-up:  It is very important that you make an appointment with your clinic and go to the appointment.  If you do not follow-up with your primary doctor, it may result in missing an important development which could result in permanent injury or disability and/or lasting pain.  If there is any problem keeping your appointment, call your doctor or return to the Emergency Department.    Medications:  Take your medications as directed by your doctor today.  Before using over-the-counter medications, ask your doctor and make sure to take the medications as directed.  If you have any questions about medications, ask your  "doctor.    Diet:  Resume your normal diet as much as possible, but do not eat fried, fatty or spicy foods while you have pain.  Do not drink alcohol or have caffeine.  Do not smoke tobacco.    Probiotics: If you have been given an antibiotic, you may want to also take a probiotic pill or eat yogurt with live cultures. Probiotics have \"good bacteria\" to help your intestines stay healthy. Studies have shown that probiotics help prevent diarrhea and other intestine problems (including C. diff infection) when you take antibiotics. You can buy these without a prescription in the pharmacy section of the store.     If you were given a prescription for medicine here today, be sure to read all of the information (including the package insert) that comes with your prescription.  This will include important information about the medicine, its side effects, and any warnings that you need to know about.  The pharmacist who fills the prescription can provide more information and answer questions you may have about the medicine.  If you have questions or concerns that the pharmacist cannot address, please call or return to the Emergency Department.         Opioid Medication Information    Pain medications are among the most commonly prescribed medicines, so we are including this information for all our patients. If you did not receive pain medication or get a prescription for pain medicine, you can ignore it.     You may have been given a prescription for an opioid (narcotic) pain medicine and/or have received a pain medicine while here in the Emergency Department. These medicines can make you drowsy or impaired. You must not drive, operate dangerous equipment, or engage in any other dangerous activities while taking these medications. If you drive while taking these medications, you could be arrested for DUI, or driving under the influence. Do not drink any alcohol while you are taking these medications.     Opioid pain " medications can cause addiction. If you have a history of chemical dependency of any type, you are at a higher risk of becoming addicted to pain medications.  Only take these prescribed medications to treat your pain when all other options have been tried. Take it for as short a time and as few doses as possible. Store your pain pills in a secure place, as they are frequently stolen and provide a dangerous opportunity for children or visitors in your house to start abusing these powerful medications. We will not replace any lost or stolen medicine.  As soon as your pain is better, you should flush all your remaining medication.     Many prescription pain medications contain Tylenol  (acetaminophen), including Vicodin , Tylenol #3 , Norco , Lortab , and Percocet .  You should not take any extra pills of Tylenol  if you are using these prescription medications or you can get very sick.  Do not ever take more than 3000 mg of acetaminophen in any 24 hour period.    All opioids tend to cause constipation. Drink plenty of water and eat foods that have a lot of fiber, such as fruits, vegetables, prune juice, apple juice and high fiber cereal.  Take a laxative if you don?t move your bowels at least every other day. Miralax , Milk of Magnesia, Colace , or Senna  can be used to keep you regular.      Remember that you can always come back to the Emergency Department if you are not able to see your regular doctor in the amount of time listed above, if you get any new symptoms, or if there is anything that worries you.    Discharge Instructions  Ovarian Cyst    Abdominal pain can be caused by many things. Your doctor today has found that you have a cyst on the ovary, which appears to be the cause of your pain. Women in their reproductive years form cysts every month, but only cause pain if they are very large, or if they rupture and release blood or fluid. Fortunately, they rarely require surgery or hospitalization. The pain  from a ruptured cyst usually gets gradually better, and should be much better within a few days. If there is a large cyst, it will usually go away within 1-2 months, but needs to be watched to be sure it does go away, since sometimes a large cyst can become a cancer. There can be complications of a cyst, or other problems that cannot be found right away, so it is very important that you follow up as directed.      Return to the Emergency Department for a recheck if your pain gets worse, changes in location, or feels different.    Return to the Emergency Department right away if:  You get an oral temperature above 102oF or as directed by your doctor.  You have blood in your stools (bright red or black, tarry stools), or in your vomit.  You keep throwing up or can?t drink liquids.  You can?t have a bowel movement or you can?t pass gas.  You faint, or feel very weak.  You have bloody, frequent or painful urination.  You have new symptoms or anything that worries you.    What can I do to help myself?  Take any medication prescribed by your doctor.  You may use Tylenol  (acetaminophen) or Advil , Motrin  (ibuprofen) for pain. Be sure to read and follow the package directions, and ask your doctor if you have questions.  Narcotic pain pills. If you have been given a narcotic such as Vicodin  (hydrocodone with acetaminophen), Percocet  (oxycodone with acetaminophen), codeine, do not drive for four hours after you have taken it.  If the narcotic contains Tylenol  (acetaminophen), do not take Tylenol  with it. All narcotics will cause constipation, so eat a high fiber diet.    Avoid sex for several days, because it will probably be painful.    Follow-up:    See your doctor within 2-3 days for a re-check.    If you were given a prescription for medicine here today, be sure to read all of the information (including the package insert) that comes with your prescription.  This will include important information about the  medicine, its side effects, and any warnings that you need to know about.  The pharmacist who fills the prescription can provide more information and answer questions you may have about the medicine.  If you have questions or concerns that the pharmacist cannot address, please call or return to the Emergency Department.         Opioid Medication Information    Pain medications are among the most commonly prescribed medicines, so we are including this information for all our patients. If you did not receive pain medication or get a prescription for pain medicine, you can ignore it.     You may have been given a prescription for an opioid (narcotic) pain medicine and/or have received a pain medicine while here in the Emergency Department. These medicines can make you drowsy or impaired. You must not drive, operate dangerous equipment, or engage in any other dangerous activities while taking these medications. If you drive while taking these medications, you could be arrested for DUI, or driving under the influence. Do not drink any alcohol while you are taking these medications.     Opioid pain medications can cause addiction. If you have a history of chemical dependency of any type, you are at a higher risk of becoming addicted to pain medications.  Only take these prescribed medications to treat your pain when all other options have been tried. Take it for as short a time and as few doses as possible. Store your pain pills in a secure place, as they are frequently stolen and provide a dangerous opportunity for children or visitors in your house to start abusing these powerful medications. We will not replace any lost or stolen medicine.  As soon as your pain is better, you should flush all your remaining medication.     Many prescription pain medications contain Tylenol  (acetaminophen), including Vicodin , Tylenol #3 , Norco , Lortab , and Percocet .  You should not take any extra pills of Tylenol  if you are using  these prescription medications or you can get very sick.  Do not ever take more than 3000 mg of acetaminophen in any 24 hour period.    All opioids tend to cause constipation. Drink plenty of water and eat foods that have a lot of fiber, such as fruits, vegetables, prune juice, apple juice and high fiber cereal.  Take a laxative if you don?t move your bowels at least every other day. Miralax , Milk of Magnesia, Colace , or Senna  can be used to keep you regular.      Remember that you can always come back to the Emergency Department if you are not able to see your regular doctor in the amount of time listed above, if you get any new symptoms, or if there is anything that worries you.

## 2019-04-13 NOTE — ED AVS SNAPSHOT
[facility]  [address]  [facility] [address]  Phone:  [phone]  Fax:  [phone]                                    Di Garcia   MRN: [id number]    Department:  [facility]   Date of Visit:  4/13/2019           After Visit Summary Signature Page    I have received my discharge instructions, and my questions have been answered. I have discussed any challenges I see with this plan with the nurse or doctor.    ..........................................................................................................................................  Patient/Patient Representative Signature      ..........................................................................................................................................  Patient Representative Print Name and Relationship to Patient    ..................................................               ................................................  Date                                   Time    ..........................................................................................................................................  Reviewed by Signature/Title    ...................................................              ..............................................  Date                                               Time          22EPIC Rev 08/18

## 2019-04-14 LAB — INTERPRETATION ECG - MUSE: NORMAL

## 2019-06-11 ENCOUNTER — HOSPITAL ENCOUNTER (EMERGENCY)
Facility: CLINIC | Age: 44
Discharge: HOME | End: 2019-06-11
Attending: PHYSICIAN ASSISTANT | Admitting: PHYSICIAN ASSISTANT
Payer: OTHER GOVERNMENT

## 2019-06-11 VITALS
TEMPERATURE: 98 F | OXYGEN SATURATION: 100 % | DIASTOLIC BLOOD PRESSURE: 71 MMHG | SYSTOLIC BLOOD PRESSURE: 126 MMHG | RESPIRATION RATE: 22 BRPM | HEART RATE: 89 BPM

## 2019-06-11 DIAGNOSIS — T50.905A MEDICATION REACTION, INITIAL ENCOUNTER: ICD-10-CM

## 2019-06-11 LAB
ANION GAP SERPL CALCULATED.3IONS-SCNC: 5 MMOL/L (ref 3–14)
BASOPHILS # BLD AUTO: 0.1 10E9/L (ref 0–0.2)
BASOPHILS NFR BLD AUTO: 0.9 %
BUN SERPL-MCNC: 7 MG/DL (ref 7–30)
CALCIUM SERPL-MCNC: 8.3 MG/DL (ref 8.5–10.1)
CHLORIDE SERPL-SCNC: 108 MMOL/L (ref 94–109)
CO2 SERPL-SCNC: 26 MMOL/L (ref 20–32)
CREAT SERPL-MCNC: 0.58 MG/DL (ref 0.52–1.04)
DIFFERENTIAL METHOD BLD: NORMAL
EOSINOPHIL # BLD AUTO: 0.2 10E9/L (ref 0–0.7)
EOSINOPHIL NFR BLD AUTO: 2.8 %
ERYTHROCYTE [DISTWIDTH] IN BLOOD BY AUTOMATED COUNT: 13 % (ref 10–15)
GFR SERPL CREATININE-BSD FRML MDRD: >90 ML/MIN/{1.73_M2}
GLUCOSE SERPL-MCNC: 94 MG/DL (ref 70–99)
HCT VFR BLD AUTO: 39.7 % (ref 35–47)
HGB BLD-MCNC: 13.8 G/DL (ref 11.7–15.7)
IMM GRANULOCYTES # BLD: 0 10E9/L (ref 0–0.4)
IMM GRANULOCYTES NFR BLD: 0.4 %
LYMPHOCYTES # BLD AUTO: 2 10E9/L (ref 0.8–5.3)
LYMPHOCYTES NFR BLD AUTO: 29.3 %
MCH RBC QN AUTO: 31.1 PG (ref 26.5–33)
MCHC RBC AUTO-ENTMCNC: 34.8 G/DL (ref 31.5–36.5)
MCV RBC AUTO: 89 FL (ref 78–100)
MONOCYTES # BLD AUTO: 0.6 10E9/L (ref 0–1.3)
MONOCYTES NFR BLD AUTO: 8.7 %
NEUTROPHILS # BLD AUTO: 4 10E9/L (ref 1.6–8.3)
NEUTROPHILS NFR BLD AUTO: 57.9 %
NRBC # BLD AUTO: 0 10*3/UL
NRBC BLD AUTO-RTO: 0 /100
PLATELET # BLD AUTO: 232 10E9/L (ref 150–450)
POTASSIUM SERPL-SCNC: 3.5 MMOL/L (ref 3.4–5.3)
RBC # BLD AUTO: 4.44 10E12/L (ref 3.8–5.2)
SODIUM SERPL-SCNC: 139 MMOL/L (ref 133–144)
WBC # BLD AUTO: 6.8 10E9/L (ref 4–11)

## 2019-06-11 PROCEDURE — 99284 EMERGENCY DEPT VISIT MOD MDM: CPT | Mod: 25

## 2019-06-11 PROCEDURE — 25000128 H RX IP 250 OP 636: Performed by: EMERGENCY MEDICINE

## 2019-06-11 PROCEDURE — 80048 BASIC METABOLIC PNL TOTAL CA: CPT | Performed by: EMERGENCY MEDICINE

## 2019-06-11 PROCEDURE — 96374 THER/PROPH/DIAG INJ IV PUSH: CPT

## 2019-06-11 PROCEDURE — 96361 HYDRATE IV INFUSION ADD-ON: CPT

## 2019-06-11 PROCEDURE — 25000128 H RX IP 250 OP 636: Performed by: PHYSICIAN ASSISTANT

## 2019-06-11 PROCEDURE — 85025 COMPLETE CBC W/AUTO DIFF WBC: CPT | Performed by: EMERGENCY MEDICINE

## 2019-06-11 PROCEDURE — 25000125 ZZHC RX 250: Performed by: EMERGENCY MEDICINE

## 2019-06-11 PROCEDURE — 96375 TX/PRO/DX INJ NEW DRUG ADDON: CPT

## 2019-06-11 RX ORDER — METHYLPREDNISOLONE SODIUM SUCCINATE 125 MG/2ML
125 INJECTION, POWDER, LYOPHILIZED, FOR SOLUTION INTRAMUSCULAR; INTRAVENOUS ONCE
Status: COMPLETED | OUTPATIENT
Start: 2019-06-11 | End: 2019-06-11

## 2019-06-11 RX ORDER — SODIUM CHLORIDE 9 MG/ML
1000 INJECTION, SOLUTION INTRAVENOUS CONTINUOUS
Status: DISCONTINUED | OUTPATIENT
Start: 2019-06-11 | End: 2019-06-11 | Stop reason: HOSPADM

## 2019-06-11 RX ORDER — PREDNISONE 20 MG/1
TABLET ORAL
Qty: 10 TABLET | Refills: 0 | Status: SHIPPED | OUTPATIENT
Start: 2019-06-11 | End: 2019-06-30

## 2019-06-11 RX ORDER — DIPHENHYDRAMINE HYDROCHLORIDE 50 MG/ML
50 INJECTION INTRAMUSCULAR; INTRAVENOUS ONCE
Status: COMPLETED | OUTPATIENT
Start: 2019-06-11 | End: 2019-06-11

## 2019-06-11 RX ORDER — EPINEPHRINE 0.3 MG/.3ML
0.3 INJECTION SUBCUTANEOUS
Qty: 1 EACH | Refills: 1 | Status: SHIPPED | OUTPATIENT
Start: 2019-06-11

## 2019-06-11 RX ADMIN — SODIUM CHLORIDE 1000 ML: 9 INJECTION, SOLUTION INTRAVENOUS at 13:18

## 2019-06-11 RX ADMIN — METHYLPREDNISOLONE SODIUM SUCCINATE 125 MG: 125 INJECTION, POWDER, FOR SOLUTION INTRAMUSCULAR; INTRAVENOUS at 13:28

## 2019-06-11 RX ADMIN — FAMOTIDINE 20 MG: 10 INJECTION INTRAVENOUS at 13:34

## 2019-06-11 RX ADMIN — DIPHENHYDRAMINE HYDROCHLORIDE 50 MG: 50 INJECTION, SOLUTION INTRAMUSCULAR; INTRAVENOUS at 13:25

## 2019-06-11 ASSESSMENT — ENCOUNTER SYMPTOMS
SHORTNESS OF BREATH: 0
WHEEZING: 0
TROUBLE SWALLOWING: 1
NUMBNESS: 1
FEVER: 0

## 2019-06-11 NOTE — ED TRIAGE NOTES
Pt took 1st dose of Bactrim D along with Mucinex for sinus infection around 0930, 30 mins later started feeling itchy, shaky, difficulty swallowing and right sided numbness. Pt's presentation appears flushed and tearful. ABCs intact, A&Ox4.

## 2019-06-11 NOTE — ED PROVIDER NOTES
History     Chief Complaint:  Allergic Reaction      HPI   Di Garcia is a 43 year old female who presents with a potential allergic reaction. The patient reports that this morning around 0930, she took Bactrim for her sinus infection. She notes that within 30 minutes, the right side of her face, right arm and right leg started feeling numb and itchy. Shortly after, she also noted it was hard to swallow her food, but denies shortness of breath or wheezing. She denies a history of allergic reactions in the past. Here in the ED, she notes the numbness has subsided a bit and now is a tingling sensation. She denies a history of allergic reactions and denies any additional exposures. She denies trouble breathing, trouble tolerating her secretions, lip or tongue swelling.       Allergies:  Nalbuphine     Medications:    Bactrim    Past Medical History:    thyroid nodule    Past Surgical History:    [place] Teeth removal  Removal of Ovarian Cyst, Right Salpingectomy  Dilation and Curettage    Family History:    History reviewed. No pertinent family history.     Social History:  Smoking status: No  Alcohol use: Yes, Rare  Drug use: No  PCP: Russel Bearden  Marital Status:  Single [1]    Review of Systems   Constitutional: Negative for fever.   HENT: Positive for trouble swallowing.    Respiratory: Negative for shortness of breath and wheezing.    Neurological: Positive for numbness.   All other systems reviewed and are negative.      Physical Exam     Patient Vitals for the past 24 hrs:   BP Temp Temp src Pulse Resp SpO2   06/11/19 1400 126/71 -- -- 89 -- 100 %   06/11/19 1258 (!) 154/103 98  F (36.7  C) Oral 89 22 100 %     Physical Exam  General: Resting comfortably.  Alert and oriented.   Head:  The scalp, face, and head appear normal   Eyes:  Conjunctivae and sclerae are normal    ENT:    The oropharynx is normal    Uvula is in the midline     No airway compromise    No tongue or lip swelling. No facial  swelling.    Neck:  No lymphadenopathy  CV:  Regular rate and rhythm     Normal S1/S2    No peripheral edema    Peripheral pulses in all 4 extremities.   Resp:  Lungs are clear to auscultation    Non-labored    No rales or wheezing   GI:  Abdomen is soft, non-distended    No abdominal tenderness   MS:  Normal muscular tone   Skin:  No rash or acute skin lesions noted   Neuro: Speech is normal and fluent. Cranial nerves 2-12 grossly intact.  strength is equal. Strength intact in all 4 extremities. No pronator drift. Finger-nose finger and heel shin intact. No focal deficits. Mild decreased sensation to the right medial leg; otherwise. Intact dull and sharp sensation.     Emergency Department Course   Laboratory:  CBC: WNL (WBC 6.8, HGB 13.8, )  BMP: Calcium 8.3 (L) o/w WNL (Creatinine 0.58)    Interventions:  1318: NS 1L IV Bolus  1325: Benadryl 50 mg IV  1328: Solumedrol 125 mg IV  1334: Pepcid 20 mg IV     Emergency Department Course:  Past medical records, nursing notes, and vitals reviewed.  1302: I performed an exam of the patient and obtained history, as documented above.  IV inserted and blood drawn.    1350: I rechecked the patient. Findings and plan explained to the Patient. Patient discharged home with instructions regarding supportive care, medications, and reasons to return. The importance of close follow-up was reviewed.     Impression & Plan    Medical Decision Making:  Di Garcia is a 43 year old female who presents with a potential allergic reaction.  Patient states she has been treating for sinus infection by an ENT doctor was recently on Augmentin and was prescribed Bactrim given continued symptoms.  She states she took that this morning with immediate onset of right-sided facial/arm/leg tingling and a sensation that her throat was closing, prompting her evaluation.  On exam, there is no airway compromise, the patient was satting well into room air and is not in respiratory  distress.  There is no tongue or lip swelling and no signs of angioedema.  Patient's neurologic exam is normal and has preserved strength in bilateral upper and lower extremities as well as no drift.  Only complaint is mild tingling in the area currently is her right lateral thigh.  I did ask that Dr. Rodgers to staff this patient with me given the on constellation of symptoms, please refer to her note for further details.  The patient was given Solu-Medrol, Benadryl, and Zantac.  She feels mildly improved after this.  I do not feel epinephrine was needed given no airway involvement symptoms./Overall, I believe she safe to discharge home.  Did mention she should discontinue Bactrim given this the reaction and to follow-up with the ENT for ongoing treatment.  I do not feel strongly enough to initiate an additional antibiotic.  Will prescribe prednisone for her allergic reaction, and this should also help with her sinusitis.  She was also given a prescription for epinephrine and was asked to take Benadryl and Zantac over the next couple of days.  She will follow-up with her primary care doctor in 2 days and will follow-up with ENT for ongoing treatment.  She is asked return immediately for any shortness of breath, chest pain, trouble breathing/swallowing, weakness, worsening numbness, or any other concerns.  All questions were answered prior to discharge.  The patient understands and agrees to this plan.    Diagnosis:    ICD-10-CM    1. Medication reaction, initial encounter T50.905A CBC with platelets differential     Basic metabolic panel     Disposition:  discharged to home    Discharge Medications:  Medication List   Started    EPINEPHrine 0.3 MG/0.3ML injection 2-pack  Commonly known as:  EPIPEN/ADRENACLICK/or ANY BX GENERIC EQUIV  0.3 mg, Intramuscular, ONCE PRN     predniSONE 20 MG tablet  Commonly known as:  DELTASONE  Take two tablets (= 40mg) each day for 5 (five) days       Ganga Desir  6/11/2019    [facility] EMERGENCY DEPARTMENT  I, Ganga Desir, am serving as a scribe at 1:02 PM on 6/11/2019 to document services personally performed by Di Rasheed PA-C based on my observations and the provider's statements to me.          Di Rasehed PA-C  06/13/19 1029

## 2019-06-11 NOTE — DISCHARGE INSTRUCTIONS
Take Prednisone as prescribed. Also take Benadryl every 8 hours and Zantac twice daily. Follow up with your primary care doctor in 2 days for recheck. Call you ENT doctor for a recheck.     Discharge Instructions  Allergic Reaction    An allergic reaction can result in a rash, itching, swelling, watery eyes, or a runny nose. A serious reaction can cause swelling of your mouth or throat, or difficulty breathing (wheezing). The most serious allergy is called anaphylaxis, and can be life-threatening. Many allergies result in hives, also called urticaria.       An allergy happens when the body?s natural defense system (immune system) overreacts to something. The thing that triggers your allergic reaction is called an allergen. The first time you are exposed to your allergen, you may not have any reaction, but the body makes a protein called an antibody. The antibody lets the body recognize and remember the allergen.  Every time you are exposed to your allergen you get more antibody and your reaction can be more severe.      Generally, every Emergency Department visit should have a follow-up clinic visit with either a primary or a specialty clinic/provider. Please follow-up as instructed by your emergency provider today.    Call 911 if you have:  Swelling of the lips, tongue or throat.  Hoarse voice, drooling or trouble breathing.  Chest pain or shortness of breath.  Fainting or unconsciousness.    What can I do to help myself?  If you know what caused your allergy, do not touch it, throw any of it away, and tell others not to have it around you. Wear a medical alert bracelet with a name of your allergen on it.  If you do not know what you are allergic to, keep a journal of everything that you are exposed to (foods, soaps, medicines, etc.). Take this with you when you follow up with your primary provider or specialist (Allergist). This may help determine what is causing the allergic reaction.  Take any medicines that are  prescribed.  Antihistamines can decrease rash or itching. You may use Benadryl  (diphenhydramine) for rash or itching according to package directions, or use a prescription antihistamine as recommended by your provider.  For significant allergic reactions, you may have been given a prescription for an epinephrine (adrenaline) auto injector. Carry this with you at all times! Use it if you are having any symptoms of anaphylaxis.  Do not be afraid to use it. Return to the Emergency Department if you use your auto injector, call 911 if it does not resolve the symptoms. It is only meant to buy time until you can get to the Emergency Department!  If you were given a prescription for medicine here today, be sure to read all of the information (including the package insert) that comes with your prescription.  This will include important information about the medicine, its side effects, and any warnings that you need to know about.  The pharmacist who fills the prescription can provide more information and answer questions you may have about the medicine.  If you have questions or concerns that the pharmacist cannot address, please call or return to the Emergency Department.   Remember that you can always come back to the Emergency Department if you are not able to see your regular provider in the amount of time listed above, if you get any new symptoms, or if there is anything that worries you.

## 2019-06-11 NOTE — ED PROVIDER NOTES
Emergency Department Attending Supervision Note        I evaluated this patient in conjunction with the NP/PA.    Briefly, the patient presented with numbness right arm and chest.  First dose of bactrim this am.  No angioedema seen and lungs clear.    On my exam,   GEN: patient smiling, no distress  HEAD: atraumatic, normocephalic  EYES: pupils reactive, conjunctivae normal  ENT: TMs flat and white bilaterally, oropharynx normal with no erythema or exudate, mucus membranes moist, no angioedema  NECK: no cervical LAD  RESPIRATORY: no tachypnea, breath sounds clear to auscultation (no rales, wheezes, rhonchi)  CVS: normal S1/S2, no murmurs/rubs/gallops  ABDOMEN: soft, nontender, no masses or organomegaly, no rebound, positive bowel sounds  EXTREMITIES: intact pulses x 2 (radial pulses intact), no edema  SKIN: warm and dry  NEURO:   Overall symmetrical exam.  GCS 15.  Cranial nerves intact.  Sensation- intact PP and LT upper extremities.   5/5.  DF and PF 5/5  HEME: no bruising or petechiae/contusions  LYMPH: no lymphadenopathy    My impression is ?allergic reaction (mild) vs side effect.      Plan- prednisone to discharge x 5 days, call ENT about next steps.             Anabel Rodgers MD  06/11/19 [id number]

## 2019-06-11 NOTE — ED AVS SNAPSHOT
[facility] Emergency Department  [address]  [facility] [address]  Phone:  [phone]  Fax:  [phone]                                    Di Garcia   MRN: [id number]    Department:  [facility] Emergency Department   Date of Visit:  6/11/2019           After Visit Summary Signature Page    I have received my discharge instructions, and my questions have been answered. I have discussed any challenges I see with this plan with the nurse or doctor.    ..........................................................................................................................................  Patient/Patient Representative Signature      ..........................................................................................................................................  Patient Representative Print Name and Relationship to Patient    ..................................................               ................................................  Date                                   Time    ..........................................................................................................................................  Reviewed by Signature/Title    ...................................................              ..............................................  Date                                               Time          22EPIC Rev 08/18

## 2019-06-30 ENCOUNTER — OFFICE VISIT (OUTPATIENT)
Dept: URGENT CARE | Facility: URGENT CARE | Age: 44
End: 2019-06-30
Payer: OTHER GOVERNMENT

## 2019-06-30 VITALS
WEIGHT: 182 LBS | SYSTOLIC BLOOD PRESSURE: 124 MMHG | DIASTOLIC BLOOD PRESSURE: 74 MMHG | BODY MASS INDEX: 31.24 KG/M2 | OXYGEN SATURATION: 96 % | HEART RATE: 77 BPM | TEMPERATURE: 98 F

## 2019-06-30 DIAGNOSIS — J02.9 ACUTE PHARYNGITIS, UNSPECIFIED ETIOLOGY: Primary | ICD-10-CM

## 2019-06-30 LAB
DEPRECATED S PYO AG THROAT QL EIA: NORMAL
SPECIMEN SOURCE: NORMAL

## 2019-06-30 PROCEDURE — 99213 OFFICE O/P EST LOW 20 MIN: CPT | Performed by: PHYSICIAN ASSISTANT

## 2019-06-30 PROCEDURE — 87880 STREP A ASSAY W/OPTIC: CPT | Performed by: PHYSICIAN ASSISTANT

## 2019-06-30 PROCEDURE — 87081 CULTURE SCREEN ONLY: CPT | Performed by: PHYSICIAN ASSISTANT

## 2019-07-01 LAB
BACTERIA SPEC CULT: NORMAL
SPECIMEN SOURCE: NORMAL

## 2019-07-01 NOTE — PROGRESS NOTES
Di Garcia presents to  today for evaluation of ST x several days duration. She is specifically requesting Strep screening.     Of note, patient gives hx of 6-8 weeks of intermittent ST that she relates to sinusitis. She has had 2 courses of abx and has been following with ENT and PCP by patient report for this condition. However, patient tells me she realized she has not had any strep screening and is requesting that now.  No fever.     ROS:     HEENT: Positive nasal and sinus congestion. Positive ST. Denies any difficulty swallowing   RESP: No cough, wheezing or SOB   GI: Denies any N/V/D. No abdominal pain. Normal BM's  SKIN: Denies rash  NEURO: Negative for HA, neck stiffness, mental status changes or lethargy.     Social History     Socioeconomic History     Marital status: Single     Spouse name: Not on file     Number of children: Not on file     Years of education: Not on file     Highest education level: Not on file   Occupational History     Not on file   Social Needs     Financial resource strain: Not on file     Food insecurity:     Worry: Not on file     Inability: Not on file     Transportation needs:     Medical: Not on file     Non-medical: Not on file   Tobacco Use     Smoking status: Never Smoker     Smokeless tobacco: Never Used   Substance and Sexual Activity     Alcohol use: Yes     Comment: rare     Drug use: No     Sexual activity: Yes     Partners: Male   Lifestyle     Physical activity:     Days per week: Not on file     Minutes per session: Not on file     Stress: Not on file   Relationships     Social connections:     Talks on phone: Not on file     Gets together: Not on file     Attends Congregational service: Not on file     Active member of club or organization: Not on file     Attends meetings of clubs or organizations: Not on file     Relationship status: Not on file     Intimate partner violence:     Fear of current or ex partner: Not on file     Emotionally abused: Not on  file     Physically abused: Not on file     Forced sexual activity: Not on file   Other Topics Concern     Not on file   Social History Narrative     Not on file       History reviewed. No pertinent past medical history.  Current Outpatient Medications   Medication     EPINEPHrine (EPIPEN/ADRENACLICK/OR ANY BX GENERIC EQUIV) 0.3 MG/0.3ML injection 2-pack     levonorgestrel (MIRENA) 20 MCG/24HR IUD     No current facility-administered medications for this visit.            Allergies   Allergen Reactions     Bactrim [Sulfamethoxazole W/Trimethoprim]      Nalbuphine      Other reaction(s): Vomiting           OBJECTIVE:  /74   Pulse 77   Temp 98  F (36.7  C) (Tympanic)   Wt 82.6 kg (182 lb)   SpO2 96%   BMI 31.24 kg/m        General appearance: alert and no apparent distress  Skin color is pink and without rash.  HEENT:   Conjunctiva not injected.  Sclera clear.  Left TM is normal: no effusions, no erythema, and normal landmarks.  Right TM is normal: no effusions, no erythema, and normal landmarks.  Nasal mucosa is congested   Oropharyngeal exam is positive for mild, diffuse, erythema and post-nasal drip.  No plaque, exudate, lesions, or ulcers.   Neck is supple, FROM with no adenopathy  CARDIAC:NORMAL - regular rate and rhythm without murmur.  RESP: Normal - CTA without rales, rhonchi, or wheezing.  NEURO: Alert and oriented.  Normal speech and mentation.  CN II/XII grossly intact.  Gait within normal limits.        LAB:     Results for orders placed or performed in visit on 06/30/19   Strep, Rapid Screen   Result Value Ref Range    Specimen Description Throat     Rapid Strep A Screen       NEGATIVE: No Group A streptococcal antigen detected by immunoassay, await culture report.       ASSESSMENT/PLAN:    (J02.9) Acute pharyngitis, unspecified etiology  (primary encounter diagnosis)  MDM: Acute ST x several days in woman with recent pmhx of persistent sinusitis by her report. RST negative. Discussed  post-nasal drip vs viral etiology for current ST. I have advised she follow-up with PCP and/or ENT MD given prolonged nature of her ENT sxs. I have also advised she follow-up immediately if any fever, difficulty swallowing, fatigue, neck swelling or if any new sxs.  Patient verbalizes understanding of and agrees to this plan.     Plan: Strep, Rapid Screen, Beta strep group A culture  As per above

## 2019-12-24 ENCOUNTER — HOSPITAL ENCOUNTER (EMERGENCY)
Facility: CLINIC | Age: 44
Discharge: HOME | End: 2019-12-24
Attending: EMERGENCY MEDICINE | Admitting: EMERGENCY MEDICINE
Payer: COMMERCIAL

## 2019-12-24 VITALS
HEART RATE: 86 BPM | DIASTOLIC BLOOD PRESSURE: 83 MMHG | SYSTOLIC BLOOD PRESSURE: 126 MMHG | RESPIRATION RATE: 12 BRPM | TEMPERATURE: 97.8 F | OXYGEN SATURATION: 100 %

## 2019-12-24 DIAGNOSIS — R00.2 PALPITATIONS: ICD-10-CM

## 2019-12-24 LAB
ANION GAP SERPL CALCULATED.3IONS-SCNC: 3 MMOL/L (ref 3–14)
BASOPHILS # BLD AUTO: 0.1 10E9/L (ref 0–0.2)
BASOPHILS NFR BLD AUTO: 0.8 %
BUN SERPL-MCNC: 9 MG/DL (ref 7–30)
CALCIUM SERPL-MCNC: 8.6 MG/DL (ref 8.5–10.1)
CHLORIDE SERPL-SCNC: 108 MMOL/L (ref 94–109)
CO2 SERPL-SCNC: 28 MMOL/L (ref 20–32)
CREAT SERPL-MCNC: 0.69 MG/DL (ref 0.52–1.04)
DIFFERENTIAL METHOD BLD: NORMAL
EOSINOPHIL # BLD AUTO: 0.1 10E9/L (ref 0–0.7)
EOSINOPHIL NFR BLD AUTO: 1.5 %
ERYTHROCYTE [DISTWIDTH] IN BLOOD BY AUTOMATED COUNT: 12.6 % (ref 10–15)
GFR SERPL CREATININE-BSD FRML MDRD: >90 ML/MIN/{1.73_M2}
GLUCOSE SERPL-MCNC: 129 MG/DL (ref 70–99)
HCT VFR BLD AUTO: 39.4 % (ref 35–47)
HGB BLD-MCNC: 13.8 G/DL (ref 11.7–15.7)
IMM GRANULOCYTES # BLD: 0.1 10E9/L (ref 0–0.4)
IMM GRANULOCYTES NFR BLD: 0.6 %
LYMPHOCYTES # BLD AUTO: 1.8 10E9/L (ref 0.8–5.3)
LYMPHOCYTES NFR BLD AUTO: 20.6 %
MCH RBC QN AUTO: 30.9 PG (ref 26.5–33)
MCHC RBC AUTO-ENTMCNC: 35 G/DL (ref 31.5–36.5)
MCV RBC AUTO: 88 FL (ref 78–100)
MONOCYTES # BLD AUTO: 0.7 10E9/L (ref 0–1.3)
MONOCYTES NFR BLD AUTO: 8 %
NEUTROPHILS # BLD AUTO: 5.9 10E9/L (ref 1.6–8.3)
NEUTROPHILS NFR BLD AUTO: 68.5 %
NRBC # BLD AUTO: 0 10*3/UL
NRBC BLD AUTO-RTO: 0 /100
PLATELET # BLD AUTO: 251 10E9/L (ref 150–450)
POTASSIUM SERPL-SCNC: 3.6 MMOL/L (ref 3.4–5.3)
RBC # BLD AUTO: 4.47 10E12/L (ref 3.8–5.2)
SODIUM SERPL-SCNC: 139 MMOL/L (ref 133–144)
TROPONIN I SERPL-MCNC: <0.015 UG/L (ref 0–0.04)
TSH SERPL DL<=0.005 MIU/L-ACNC: 1.38 MU/L (ref 0.4–4)
WBC # BLD AUTO: 8.6 10E9/L (ref 4–11)

## 2019-12-24 PROCEDURE — 99284 EMERGENCY DEPT VISIT MOD MDM: CPT

## 2019-12-24 PROCEDURE — 93005 ELECTROCARDIOGRAM TRACING: CPT

## 2019-12-24 PROCEDURE — 85025 COMPLETE CBC W/AUTO DIFF WBC: CPT | Performed by: EMERGENCY MEDICINE

## 2019-12-24 PROCEDURE — 80048 BASIC METABOLIC PNL TOTAL CA: CPT | Performed by: EMERGENCY MEDICINE

## 2019-12-24 PROCEDURE — 84443 ASSAY THYROID STIM HORMONE: CPT | Performed by: EMERGENCY MEDICINE

## 2019-12-24 PROCEDURE — 84484 ASSAY OF TROPONIN QUANT: CPT | Performed by: EMERGENCY MEDICINE

## 2019-12-24 ASSESSMENT — ENCOUNTER SYMPTOMS
FEVER: 0
COUGH: 0
PALPITATIONS: 1

## 2019-12-24 NOTE — ED AVS SNAPSHOT
[facility] Emergency Department  [address]  [facility] [address]  Phone:  [phone]  Fax:  [phone]                                    Di Garcia   MRN: [id number]    Department:  [facility] Emergency Department   Date of Visit:  12/24/2019           After Visit Summary Signature Page    I have received my discharge instructions, and my questions have been answered. I have discussed any challenges I see with this plan with the nurse or doctor.    ..........................................................................................................................................  Patient/Patient Representative Signature      ..........................................................................................................................................  Patient Representative Print Name and Relationship to Patient    ..................................................               ................................................  Date                                   Time    ..........................................................................................................................................  Reviewed by Signature/Title    ...................................................              ..............................................  Date                                               Time          22EPIC Rev 08/18

## 2019-12-24 NOTE — ED TRIAGE NOTES
"Patient arrives with complaints of palpitations and her \"heart hurts\". Reports palpitations beginning yesterday and resolved. Palpitations resumed starting at 2 pm today. Patient reports taking ativan at approximately 4 pm thinking it could be anxiety. Pt reports palpitations come and go and the pain is similar to that when exercising.  "

## 2019-12-25 NOTE — DISCHARGE INSTRUCTIONS
Discharge Instructions  Palpitations    Palpitations are an unusual awareness of your heartbeat. People often describe this as the heart skipping, fluttering, racing, irregular, or pounding. At this time, your doctor has found no signs that your palpitations are due to a serious or life-threatening condition. However, sometimes there is a serious problem that does not show up right away. It is important that you follow up with your doctor within 1 week, or as directed by your doctor today, to check for other serious problems. You may need more blood tests, a stress test, heart monitoring, or other tests.    Palpitations can be caused by caffeine, cigarettes, diet pills, energy drinks or supplements, other stimulants, and medications and street drugs. They can also be caused by anxiety, hormone conditions such as high thyroid, and other medical conditions. Sometimes they are a sign of abnormal rhythm in the heart, so you may need your heart checked.    Return to the Emergency Department if:  You get chest pain or tightness.  You are short of breath.  You get very weak or tired.  You pass out or faint.  Your heart rate is over 120 beats per minute for more than 10 minutes while you are resting.  You have any new symptoms, like fever, cough, numb legs, or you cough up blood.  You have anything else that worries you.    What can I do to help myself?  Fill any prescriptions the doctor gave you and take them right away.   Follow your doctor s instructions about the prescription medicines you are on. Sometimes the doctor may tell you to stop taking a medicine or change the dose.  If you smoke, this may be a good time to quit! The less you can smoke, the better.  Do not use energy drinks, diet pills, or stimulants. Limit your use of caffeine.    Follow up with your doctor:  Within 1 week, or sooner if instructed.  If you keep having palpitations.  If you need help to quit smoking.  If you were given a prescription for  medicine here today, be sure to read all of the information (including the package insert) that comes with your prescription.  This will include important information about the medicine, its side effects, and any warnings that you need to know about.  The pharmacist who fills the prescription can provide more information and answer questions you may have about the medicine.  If you have questions or concerns that the pharmacist cannot address, please call or return to the Emergency Department.         Opioid Medication Information    Pain medications are among the most commonly prescribed medicines, so we are including this information for all our patients. If you did not receive pain medication or get a prescription for pain medicine, you can ignore it.     You may have been given a prescription for an opioid (narcotic) pain medicine and/or have received a pain medicine while here in the Emergency Department. These medicines can make you drowsy or impaired. You must not drive, operate dangerous equipment, or engage in any other dangerous activities while taking these medications. If you drive while taking these medications, you could be arrested for DUI, or driving under the influence. Do not drink any alcohol while you are taking these medications.     Opioid pain medications can cause addiction. If you have a history of chemical dependency of any type, you are at a higher risk of becoming addicted to pain medications.  Only take these prescribed medications to treat your pain when all other options have been tried. Take it for as short a time and as few doses as possible. Store your pain pills in a secure place, as they are frequently stolen and provide a dangerous opportunity for children or visitors in your house to start abusing these powerful medications. We will not replace any lost or stolen medicine.  As soon as your pain is better, you should flush all your remaining medication.     Many prescription pain  medications contain Tylenol  (acetaminophen), including Vicodin , Tylenol #3 , Norco , Lortab , and Percocet .  You should not take any extra pills of Tylenol  if you are using these prescription medications or you can get very sick.  Do not ever take more than 3000 mg of acetaminophen in any 24 hour period.    All opioids tend to cause constipation. Drink plenty of water and eat foods that have a lot of fiber, such as fruits, vegetables, prune juice, apple juice and high fiber cereal.  Take a laxative if you don t move your bowels at least every other day. Miralax , Milk of Magnesia, Colace , or Senna  can be used to keep you regular.      Remember that you can always come back to the Emergency Department if you are not able to see your regular doctor in the amount of time listed above, if you get any new symptoms, or if there is anything that worries you.

## 2019-12-25 NOTE — ED PROVIDER NOTES
History     Chief Complaint:  Palpitations      HPI   Di Garcia is a 44 year old female who presents with palpitations. The patient reports having palpations yesterday while she was just sitting down. She states the palpitations feels like her heart was beating fast and hard. The palpitations resolved yesterday. However, she had another episode of palpitations earlier today at 1400 which lasted longer than the episode yesterday. She thought it was due to anxiety and therefore she took an ativan however she would still like to be evaluated. The patient also took magnesium today due to concerns of having low magnesium causing her palpitations. She denies any chest pain with deep breathing. She denies any fevers, cough, cold, caffeine use, or alcohol use. She has not taken any herbal supplements or decongestants. The patient is getting enough sleep at work. She states that she is still eating normally but has not been drinking fluids as much due to increased stress from work and the holidays.      Allergies:  Bactrim [Sulfamethoxazole W/Trimethoprim]  Nalbuphine     Medications:    levonorgestrel    Past Medical History:    Endometrial mass  Bilateral ovarian cysts    Past Surgical History:    D&C  Moody teeth removal     Family History:    History reviewed. No pertinent family history.     Social History:  The patient was accompanied to the ED by family members.  Smoking Status: Never Smoker  Smokeless Tobacco: Never Used  Alcohol Use: not currently  Drug Use: No  PCP: Russel Bearden     Review of Systems   Constitutional: Negative for fever.   Respiratory: Negative for cough.    Cardiovascular: Positive for palpitations. Negative for chest pain.   All other systems reviewed and are negative.      Physical Exam     Patient Vitals for the past 24 hrs:   BP Temp Temp src Heart Rate Resp SpO2   12/24/19 1721 (!) 146/102 97.8  F (36.6  C) Oral 96 16 99 %        Physical Exam    GEN: alert    HEAD:  atraumatic    EYES: pupils reactive, extraocular muscles intact, conjunctivae normal    ENT: TMs normal as are EACs; nares patent; normal posterior pharynx and oral mucosa    NECK: no posterior midline tenderness, no meningeal signs, trachea midline     RESPIRATORY: no tachypnea, breath sounds clear to auscultation    CVS: normal S1/S2, no murmurs/rubs/gallops    ABDOMEN: soft, nontender, no masses or organomegaly, no rebound, positive bowel sounds    MUSCULOSKELETAL: no deformities    SKIN: warm and dry, no acute rashes or ulceration, no erythema     NEURO: GCS 15, cranial nerves intact.  Motor and sensory- good tone; normal gait and coordination    LYMPH: no lymphadenopathy    PSYCHE:  Slightly anxious    Emergency Department Course   ECG:  ECG taken at 1719  Normal sinus rhythm  Normal ECG  Rate 95 bpm. NJ interval 136 ms. QRS duration 88 ms. QT/QTc 362/454 ms. P-R-T axes 46 -14 45.     Laboratory:  Laboratory findings were communicated with the patient who voiced understanding of the findings.    CBC:  WBC 8.6, HGB 13.8, , o/w WNL     BMP: Glucose 129 (H), o/w WNL (Creatinine: 0.69)     Troponin(1733):  <0.015      TSH with free T4 reflex: 1.38     Emergency Department Course:  Past medical records, nursing notes, and vitals reviewed.     IV was inserted and blood was drawn for laboratory testing, results above.      1825 I performed an exam of the patient as documented above.         Findings and plan explained to the Patient. Patient discharged home with instructions regarding supportive care, medications, and reasons to return. The importance of close follow-up was reviewed.        Impression & Plan     Medical Decision Making:  Di Garcia is a 44 year old female who is previously healthy who presents with palpitations. She describes what sounds to be PVCs PACs which are relatively isolated but were persistent today. She describes a pause and then a strong beat which she says is alarming to her  but not painful. The nurses note did say that she had chest pain which she denies. She just simply says that her heart beat stronger with subsequent beats as described.     The patient does say that is not drinking as much water as usual because she has been very stressed at work and also the stress of moriah. She also has a history of anxiety and does admit to being under more stressful condition than usual.     The patient says that since she took the magnesium and ativan, she has felt normal here. We have not captured them on EKG and she does have a normal sinus EKG with no concerning changes. Her vital signs are normal with just slightly elevated blood pressure.     I am going to recommend continue pushing clear liquids and use of her magnesium that she typically has used. I will set her up for an event monitor. She will follow up for that and then follow up with her primary doctor. If she develops new symptoms in addition to palpitations that are concerning or develops chest pain that is sustained then she should return to the ED.     Discharge Diagnosis:    ICD-10-CM    1. Palpitations R00.2 Cardiac Event Monitor Adult Pediatric       Disposition:  The patient is discharged to home.      Scribe Disclosure:  I, Isaias Saldana, am serving as a scribe at 6:25 PM on 12/24/2019 to document services personally performed by Mark Crowley MD based on my observations and the provider's statements to me.      12/24/2019   Mark Crowley MD Egger, Thomas W, MD  12/25/19 0144

## 2019-12-26 LAB — INTERPRETATION ECG - MUSE: NORMAL

## 2020-06-28 ENCOUNTER — APPOINTMENT (OUTPATIENT)
Dept: MRI IMAGING | Facility: CLINIC | Age: 45
End: 2020-06-28
Attending: EMERGENCY MEDICINE
Payer: COMMERCIAL

## 2020-06-28 ENCOUNTER — HOSPITAL ENCOUNTER (EMERGENCY)
Facility: CLINIC | Age: 45
Discharge: HOME | End: 2020-06-28
Attending: EMERGENCY MEDICINE | Admitting: EMERGENCY MEDICINE
Payer: COMMERCIAL

## 2020-06-28 VITALS
OXYGEN SATURATION: 95 % | TEMPERATURE: 97.8 F | DIASTOLIC BLOOD PRESSURE: 80 MMHG | HEART RATE: 78 BPM | SYSTOLIC BLOOD PRESSURE: 131 MMHG | RESPIRATION RATE: 16 BRPM

## 2020-06-28 DIAGNOSIS — H53.9 VISION CHANGES: ICD-10-CM

## 2020-06-28 DIAGNOSIS — R20.2 PARESTHESIAS: ICD-10-CM

## 2020-06-28 DIAGNOSIS — R51.9 NONINTRACTABLE HEADACHE, UNSPECIFIED CHRONICITY PATTERN, UNSPECIFIED HEADACHE TYPE: ICD-10-CM

## 2020-06-28 LAB
ANION GAP SERPL CALCULATED.3IONS-SCNC: 6 MMOL/L (ref 3–14)
BASOPHILS # BLD AUTO: 0.1 10E9/L (ref 0–0.2)
BASOPHILS NFR BLD AUTO: 0.8 %
BUN SERPL-MCNC: 12 MG/DL (ref 7–30)
CALCIUM SERPL-MCNC: 8.4 MG/DL (ref 8.5–10.1)
CHLORIDE SERPL-SCNC: 107 MMOL/L (ref 94–109)
CO2 SERPL-SCNC: 25 MMOL/L (ref 20–32)
CREAT SERPL-MCNC: 0.6 MG/DL (ref 0.52–1.04)
DIFFERENTIAL METHOD BLD: NORMAL
EOSINOPHIL # BLD AUTO: 0.3 10E9/L (ref 0–0.7)
EOSINOPHIL NFR BLD AUTO: 2.5 %
ERYTHROCYTE [DISTWIDTH] IN BLOOD BY AUTOMATED COUNT: 13.2 % (ref 10–15)
GFR SERPL CREATININE-BSD FRML MDRD: >90 ML/MIN/{1.73_M2}
GLUCOSE BLDC GLUCOMTR-MCNC: 106 MG/DL (ref 70–99)
GLUCOSE SERPL-MCNC: 99 MG/DL (ref 70–99)
HCT VFR BLD AUTO: 42.3 % (ref 35–47)
HGB BLD-MCNC: 14.1 G/DL (ref 11.7–15.7)
IMM GRANULOCYTES # BLD: 0.1 10E9/L (ref 0–0.4)
IMM GRANULOCYTES NFR BLD: 0.7 %
LYMPHOCYTES # BLD AUTO: 3.4 10E9/L (ref 0.8–5.3)
LYMPHOCYTES NFR BLD AUTO: 32 %
MCH RBC QN AUTO: 30.3 PG (ref 26.5–33)
MCHC RBC AUTO-ENTMCNC: 33.3 G/DL (ref 31.5–36.5)
MCV RBC AUTO: 91 FL (ref 78–100)
MONOCYTES # BLD AUTO: 0.9 10E9/L (ref 0–1.3)
MONOCYTES NFR BLD AUTO: 8 %
NEUTROPHILS # BLD AUTO: 5.9 10E9/L (ref 1.6–8.3)
NEUTROPHILS NFR BLD AUTO: 56 %
NRBC # BLD AUTO: 0 10*3/UL
NRBC BLD AUTO-RTO: 0 /100
PLATELET # BLD AUTO: 238 10E9/L (ref 150–450)
POTASSIUM SERPL-SCNC: 3.6 MMOL/L (ref 3.4–5.3)
RBC # BLD AUTO: 4.65 10E12/L (ref 3.8–5.2)
SODIUM SERPL-SCNC: 138 MMOL/L (ref 133–144)
TROPONIN I SERPL-MCNC: <0.015 UG/L (ref 0–0.04)
WBC # BLD AUTO: 10.6 10E9/L (ref 4–11)

## 2020-06-28 PROCEDURE — 85025 COMPLETE CBC W/AUTO DIFF WBC: CPT | Performed by: EMERGENCY MEDICINE

## 2020-06-28 PROCEDURE — 93005 ELECTROCARDIOGRAM TRACING: CPT

## 2020-06-28 PROCEDURE — 25000128 H RX IP 250 OP 636: Performed by: EMERGENCY MEDICINE

## 2020-06-28 PROCEDURE — 25500064 ZZH RX 255 OP 636: Performed by: EMERGENCY MEDICINE

## 2020-06-28 PROCEDURE — 00000146 ZZHCL STATISTIC GLUCOSE BY METER IP

## 2020-06-28 PROCEDURE — 99285 EMERGENCY DEPT VISIT HI MDM: CPT | Mod: 25

## 2020-06-28 PROCEDURE — 70553 MRI BRAIN STEM W/O & W/DYE: CPT

## 2020-06-28 PROCEDURE — A9585 GADOBUTROL INJECTION: HCPCS | Performed by: EMERGENCY MEDICINE

## 2020-06-28 PROCEDURE — 96374 THER/PROPH/DIAG INJ IV PUSH: CPT

## 2020-06-28 PROCEDURE — 84484 ASSAY OF TROPONIN QUANT: CPT | Performed by: EMERGENCY MEDICINE

## 2020-06-28 PROCEDURE — 96375 TX/PRO/DX INJ NEW DRUG ADDON: CPT

## 2020-06-28 PROCEDURE — 96361 HYDRATE IV INFUSION ADD-ON: CPT

## 2020-06-28 PROCEDURE — 70549 MR ANGIOGRAPH NECK W/O&W/DYE: CPT

## 2020-06-28 PROCEDURE — 25800030 ZZH RX IP 258 OP 636: Performed by: EMERGENCY MEDICINE

## 2020-06-28 PROCEDURE — 70544 MR ANGIOGRAPHY HEAD W/O DYE: CPT | Mod: XS

## 2020-06-28 PROCEDURE — 80048 BASIC METABOLIC PNL TOTAL CA: CPT | Performed by: EMERGENCY MEDICINE

## 2020-06-28 RX ORDER — GADOBUTROL 604.72 MG/ML
10 INJECTION INTRAVENOUS ONCE
Status: COMPLETED | OUTPATIENT
Start: 2020-06-28 | End: 2020-06-28

## 2020-06-28 RX ORDER — DEXAMETHASONE SODIUM PHOSPHATE 10 MG/ML
10 INJECTION, SOLUTION INTRAMUSCULAR; INTRAVENOUS ONCE
Status: COMPLETED | OUTPATIENT
Start: 2020-06-28 | End: 2020-06-28

## 2020-06-28 RX ORDER — KETOROLAC TROMETHAMINE 15 MG/ML
15 INJECTION, SOLUTION INTRAMUSCULAR; INTRAVENOUS ONCE
Status: COMPLETED | OUTPATIENT
Start: 2020-06-28 | End: 2020-06-28

## 2020-06-28 RX ORDER — DIPHENHYDRAMINE HYDROCHLORIDE 50 MG/ML
25 INJECTION INTRAMUSCULAR; INTRAVENOUS ONCE
Status: COMPLETED | OUTPATIENT
Start: 2020-06-28 | End: 2020-06-28

## 2020-06-28 RX ORDER — METOCLOPRAMIDE HYDROCHLORIDE 5 MG/ML
10 INJECTION INTRAMUSCULAR; INTRAVENOUS ONCE
Status: COMPLETED | OUTPATIENT
Start: 2020-06-28 | End: 2020-06-28

## 2020-06-28 RX ADMIN — KETOROLAC TROMETHAMINE 15 MG: 15 INJECTION, SOLUTION INTRAMUSCULAR; INTRAVENOUS at 03:55

## 2020-06-28 RX ADMIN — GADOBUTROL 10 ML: 604.72 INJECTION INTRAVENOUS at 02:43

## 2020-06-28 RX ADMIN — DEXAMETHASONE SODIUM PHOSPHATE 10 MG: 10 INJECTION, SOLUTION INTRAMUSCULAR; INTRAVENOUS at 03:55

## 2020-06-28 RX ADMIN — METOCLOPRAMIDE HYDROCHLORIDE 10 MG: 5 INJECTION INTRAMUSCULAR; INTRAVENOUS at 01:20

## 2020-06-28 RX ADMIN — SODIUM CHLORIDE 1000 ML: 9 INJECTION, SOLUTION INTRAVENOUS at 01:07

## 2020-06-28 RX ADMIN — DIPHENHYDRAMINE HYDROCHLORIDE 25 MG: 50 INJECTION, SOLUTION INTRAMUSCULAR; INTRAVENOUS at 01:20

## 2020-06-28 ASSESSMENT — ENCOUNTER SYMPTOMS
NUMBNESS: 1
HEADACHES: 1
DIZZINESS: 1

## 2020-06-28 NOTE — ED AVS SNAPSHOT
[facility] Emergency Department  [address]  [facility] [address]  Phone:  [phone]  Fax:  [phone]                                    Di Garcia   MRN: [id number]    Department:  [facility] Emergency Department   Date of Visit:  6/28/2020           After Visit Summary Signature Page    I have received my discharge instructions, and my questions have been answered. I have discussed any challenges I see with this plan with the nurse or doctor.    ..........................................................................................................................................  Patient/Patient Representative Signature      ..........................................................................................................................................  Patient Representative Print Name and Relationship to Patient    ..................................................               ................................................  Date                                   Time    ..........................................................................................................................................  Reviewed by Signature/Title    ...................................................              ..............................................  Date                                               Time          22EPIC Rev 08/18

## 2020-06-28 NOTE — ED TRIAGE NOTES
"Pt states left sided numbness for over 3 days, worse tonight. Also notes \"vision changes\" tonight, pt states is able to see out of each eye individually without issue, \"but when they are together things are off.\" ABCs intact GCS 15  "

## 2020-06-28 NOTE — ED PROVIDER NOTES
History     Chief Complaint:  Eye Problem      HPI   Di Garcia is a 44 year old female with remote history of migraines who presents for evaluation of 3 days of gradual onset right-sided headache, associated with left facial numbness/paresthesias, left arm paresthesias, left leg clumsiness, disequilibrium, and vague vision changes.  Symptoms began with a headache 3 days ago and the other symptoms slowly evolved over the last 2 days.  She describes the vision changes as not exactly being double vision but discoordinated gaze.  She does have a history of migraine headaches which are typically diffuse, but has not had a migraine headache for approximately 12 years.  She denies any trauma, fall, or other concerns.  For several months she has had some upper back pain that her chiropractor has worked on.    Allergies:  Bactrim [Sulfamethoxazole W/Trimethoprim]  Nalbuphine      Medications:    Epipen   Ativan      Past Medical History:    Ovarian cysts  Menorrhagia      Past Surgical History:    [place] teeth removal   Dilation and curettage   Hysteroscopy-insertion IUD, removal of ovarian cyst, right salpingectomy      Family History:    Colon cancer     Social History:  Tobacco use: Never  Alcohol use: Rarely   Drug use: No  Marital Status:  Single [1]     Review of Systems   Eyes: Positive for visual disturbance.   Neurological: Positive for dizziness, numbness and headaches.   All other systems reviewed and are negative.      Physical Exam     Patient Vitals for the past 24 hrs:   BP Temp Temp src Pulse Heart Rate Resp SpO2   06/28/20 0320 -- -- -- -- -- -- 97 %   06/28/20 0300 131/80 -- -- 78 -- -- 98 %   06/28/20 0145 129/82 -- -- 87 -- -- 97 %   06/28/20 0130 136/82 -- -- 92 -- -- 100 %   06/28/20 0115 133/75 -- -- 86 -- -- 100 %   06/28/20 0050 (!) 145/91 97.8  F (36.6  C) Oral -- 93 16 99 %       Physical Exam  Constitutional: Alert, attentive  HENT:    Nose: Nose normal.    Mouth/Throat: Oropharynx is  clear, mucous membranes are moist  Eyes: EOM are normal. Pupils are equal, round, and reactive to light.   CV: Regular rate and rhythm, no murmurs, rubs or gallops.  Chest: Effort normal and breath sounds normal.   GI: No distension. There is no tenderness  MSK: Normal range of motion.   Neurological:   GCS 15; A/Ox3; Cranial nerves 2-12 intact;   5/5 strength throughout the upper and lower extremities;   sensation intact to light touch throughout the upper and lower extremities;   2+ DTRs to the bilateral upper and lower extremities (biceps, BRs, patellar, achilles);   normal fine motor coordination intact bilaterally;   No meningismus   Skin: Skin is warm and dry.        Emergency Department Course   ECG:  Indication: Stroke Workup  Time: 0117  Vent. Rate 90 bpm. MN interval 134. QRS duration 90. QT/QTc 372/455. P-R-T axis 35 -14 23.  Sinus rhythm. Normal ECG. No significant change compared to EKG dated 12/24/19. Read time: 0119       Imaging:  Radiographic findings were communicated with the patient who voiced understanding of the findings.    MRA Neck w/o and w/ contrast angiogram:   1.  No significant stenosis, occlusion or dissection.   2.  Asymmetric enlargement of the right lobe of the thyroid with a possible nodule measuring 2 cm. Recommend non-emergent thyroid function testing and thyroid ultrasound for further assessment, as per radiology     MR Brain w/o and w/ contrast:    Normal head MRI, as per radiology    MRA Brain ([place] [facility] [place]) w/o contrast:   Normal MRA [place] [facility] [place], as per radiology      Laboratory:  CBC+: WBC: 10.6, HGB: 14.1, PLT: 238  BMP: Calcium: 8.4 (L), o/w WNL (Creatinine: 0.60)    0100 Glucose by meter: 106 (H)   0107 Troponin: <0.015       Interventions:  0107 NS 1L IV   0120 Benadryl 25 mg IV   Reglan 10 mg IV   0154 Gadavist 10 ml IV   0355 Toradol 15 mg IV    Decadron 10 mg IV     Emergency Department Course:  Nursing notes and vitals reviewed. (0105) I performed an exam of  the patient as documented above.     IV inserted. Medicine administered as documented above. Blood drawn. This was sent to the lab for further testing, results above.    The patient was sent for a neck MRA and brain MR and MRA while in the emergency department, findings above.   EKG obtained in the ED, see results above.      (0335) I rechecked the patient and discussed the results of her workup thus far.     Findings and plan explained to the Patient. Patient discharged home with instructions regarding supportive care, medications, and reasons to return. The importance of close follow-up was reviewed.     I personally reviewed the laboratory results with the Patient and answered all related questions prior to discharge.     Impression & Plan      Medical Decision Making:  This is a 44-year-old female with history of remote migraine headache syndrome who presents for evaluation of atypical headache with a number of associated neurologic symptoms described above.  Differential includes tumor, stroke, cervical vessel dissection, among others.  She does have a normal neurologic exam here.  MR brain and MRA head/neck were performed given the concerns described.  These are fortunately negative.  Symptoms are improved with supportive medications for headache.  Plan outpatient follow-up and possible neurology referral as this could represent complex migraine or evolving migraine syndrome.  No red flags to suggest subarachnoid hemorrhage or meningitis nor to indicate LP.  Primary care follow-up in 2 to 3 days and strict return precautions for weakness, worse headache, or any other concern.    Diagnosis:    ICD-10-CM    1. Nonintractable headache, unspecified chronicity pattern, unspecified headache type  R51    2. Vision changes  H53.9    3. Paresthesias  R20.2        Disposition:  discharged to home    Scribe Disclosure:  Jill GLASS, am serving as a scribe on 6/28/2020 at 1:24 AM to personally document services  performed by Ramesh Clay MD based on my observations and the provider's statements to me.      Ramesh Clay MD  6/28/2020   [facility] EMERGENCY DEPARTMENT       Ramesh Clay MD  06/28/20 0414

## 2020-06-29 LAB — INTERPRETATION ECG - MUSE: NORMAL

## 2020-09-23 ENCOUNTER — OFFICE VISIT (OUTPATIENT)
Dept: URGENT CARE | Facility: URGENT CARE | Age: 45
End: 2020-09-23
Payer: COMMERCIAL

## 2020-09-23 DIAGNOSIS — J02.9 ACUTE PHARYNGITIS, UNSPECIFIED ETIOLOGY: Primary | ICD-10-CM

## 2020-09-23 PROCEDURE — 99213 OFFICE O/P EST LOW 20 MIN: CPT | Performed by: PHYSICIAN ASSISTANT

## 2020-09-23 RX ORDER — PENICILLIN V POTASSIUM 500 MG/1
500 TABLET, FILM COATED ORAL 2 TIMES DAILY
Qty: 20 TABLET | Refills: 0 | Status: SHIPPED | OUTPATIENT
Start: 2020-09-23 | End: 2020-10-03

## 2020-09-23 RX ORDER — ACETAMINOPHEN 325 MG/1
325-650 TABLET ORAL EVERY 6 HOURS PRN
COMMUNITY
End: 2021-09-12

## 2020-09-24 NOTE — NURSING NOTE
Chief Complaint   Patient presents with     Urgent Care     Fever     for 36 hours     Pharyngitis     for 2 days     Headache        [facility] [place]

## 2020-09-24 NOTE — PROGRESS NOTES
SUBJECTIVE:  Di Garcia is a 44 year old female with a chief complaint of sore throat.  Onset of symptoms was 1 day(s) ago.    Course of illness: sudden onset.  Severity moderate and severe  Current and Associated symptoms: fever, chills, ear pain bilateral and facial pain/pressure  Treatment measures tried include Tylenol/Ibuprofen.  Predisposing factors include None.    No past medical history on file.  Current Outpatient Medications   Medication Sig Dispense Refill     acetaminophen (TYLENOL) 325 MG tablet Take 325-650 mg by mouth every 6 hours as needed for mild pain       levonorgestrel (MIRENA) 20 MCG/24HR IUD 1 each by Intrauterine route once       EPINEPHrine (EPIPEN/ADRENACLICK/OR ANY BX GENERIC EQUIV) 0.3 MG/0.3ML injection 2-pack Inject 0.3 mLs (0.3 mg) into the muscle once as needed (Patient not taking: Reported on 9/23/2020) 1 each 1     Social History     Tobacco Use     Smoking status: Never Smoker     Smokeless tobacco: Never Used   Substance Use Topics     Alcohol use: Not Currently       ROS:  10 point ROS negative except as listed above      OBJECTIVE:   There were no vitals taken for this visit.  GENERAL APPEARANCE: healthy, alert and no distress  EYES: EOMI,  PERRL, conjunctiva clear  HENT: ear canals and TM's normal.  Nose normal.  Pharynx erythematous with some exudate noted.  NECK: supple, non-tender to palpation, no adenopathy noted  RESP: lungs clear to auscultation - no rales, rhonchi or wheezes  CV: regular rates and rhythm, normal S1 S2, no murmur noted  ABDOMEN:  soft, nontender, no HSM or masses and bowel sounds normal  SKIN: no suspicious lesions or rashes        ASSESSMENT:  (J02.9) Acute pharyngitis, unspecified etiology  (primary encounter diagnosis)  Comment: presumptive strep threatment  Plan: penicillin V (VEETID) 500 MG tablet,         Symptomatic COVID-19 Virus (Coronavirus) by PCR        Covid-19  PT was evaluated during a global COVID-19 pandemic, which necessitated  consideration that the patient might be at risk for infection with the SARS-CoV-2 virus that causes COVID-19.   Applicable protocols for evaluation were followed during the patient's care.   COVID-19 was considered as part of the patient's evaluation. The plan for testing is:  a test was ordered during this visit.    No severe headache, chest pain, shortness of breath  No additional infectious symptoms  Rest, isolate for 10 days, hydrate, test, follow up if worsening or new symptoms  HH members to isolate until test results, if positive isolate for 2 weeks and follow up for testing if symptoms occur  Surgical mask, gown, shield, hairnet, gloves worn by provider      Patient Instructions     Patient Education     Coronavirus Disease 2019 (COVID-19): Caring for Yourself or Others   If you or a household member have symptoms of COVID-19, follow the guidelines below. This will help you manage symptoms and keep the virus from spreading.  If you have symptoms of COVID-19    Stay home and contact your care team. They will tell you what to do    Don t panic. Keep in mind that other illnesses can cause similar symptoms.    Stay away from work, school, and public places.    Limit physical contact with others in your home. Limit visitors. No kissing.    Clean surfaces you touch with disinfectant.    If you need to cough or sneeze, do it into a tissue. Then, throw the tissue into the trash. If you don't have tissues, cough or sneeze into the bend of your elbow.    Don t share food or personal items with people in your household. This includes items like eating and drinking utensils, towels, and bedding.    Wear a cloth face mask around other people. It should cover your nose and mouth. You may need to make your own mask using a bandana, T-shirt, or other cloth. See the CDC s instructions on how to make a mask.    If you need to go to a hospital or clinic, call ahead to let them know. Expect the care team to wear masks, gowns,  gloves, and eye protection. You may be put in a separate room.    Follow all instructions from your care team.    If you have been diagnosed with COVID-19    Stay home and away from others, including other people in your home. (This is called self-isolation.) Don t leave home unless you need to get medical care. Don't go to work, school, or public places. Don't use buses, taxis, or other public transportation.    Follow all instructions from your care team.    If you need to go to a hospital or clinic, call ahead to let them know. Expect the care team to wear masks, gowns, gloves, and eye protection. You may be put in a separate room.    Wear a face mask over your nose and mouth. This is to protect others from your germs. If you can t wear a mask, your caregivers should wear one. You may need to make your own mask using a bandana, T-shirt, or other cloth. See the [facility] s instructions on how to make a mask.    Have no contact with pets and other animals.    Don t share food or personal items with people in your household. This includes items like eating and drinking utensils, towels, and bedding.    Don t share food or personal items with people in your household. This includes items like eating and drinking utensils, towels, and bedding.    If you need to cough or sneeze, do it into a tissue. Then, throw the tissue into the trash. If you don't have tissues, cough or sneeze into the bend of your elbow.    Wash your hands often.    Self-care at home   At this time, there is no medicine approved to prevent or treat COVID-19. Experts are testing different medicines, trying to find one that works.  So far, the most proven treatment is to support your body while it fights the virus.    Get extra rest.    Drink extra fluids (6 to 8 glasses of liquids each day), unless a doctor has told you not to. Ask your care team which fluids are best for you. Avoid fluids that contain caffeine or alcohol.    Taking over-the-counter (OTC)  pain medicine to reduce pain and fever. Your care team will tell you which medicine to use.  If you ve been in the hospital for COVID-19, follow your care team s instructions. They will tell you when to stop self-isolation. They may also have you change positions to help your breathing (such as lying on your belly).  If a test showed that you have COVID-19, you may be asked to donate plasma after you ve recovered. (This is called COVID-19 convalescent plasma donation.) The plasma may contain antibodies to help fight the virus in others. Scientists are testing whether this might be a treatment in the future. For more information, talk to your care team.  Caring for a sick person     Follow all instructions from the care team.    Wash your hands often.    Wear protective clothing as advised.    Make sure the sick person wears a mask. If they can't wear a mask, don't stay in the same room with them. If you must be in the same room, wear a face mask. Make sure the mask covers both the nose and mouth.    Keep track of the sick person s symptoms.    Clean surfaces often with disinfectant. This includes phones, kitchen counters, fridge door handles, bathroom surfaces, and others.    Don t let anyone share household items with the sick person. This includes eating and drinking tools, towels, sheets, and blankets.    Clean fabrics and laundry well.    Keep other people and pets away from the sick person.    When you can stop self-isolation  When you are sick with COVID-19, you should stay away from other people. This is called self-isolation. The rules for ending self-isolation depend on your health in general.  If you are normally healthy  You can stop self-isolation when all 3 of these are true:  1. You ve had no fever--and no medicine that reduces fever--for at least 24 hours. And   2. Your symptoms are better, such as cough or trouble breathing. And   3. At least 10 days have passed since your symptoms first  started.  Talk with your care team before you leave home. They may tell you it s okay to leave, or they may give you different advice.  If you have a weak immune system  If you re being treated for cancer, have an immune disorder (such as HIV), or have had a transplant (organ or bone marrow), follow your care team s instructions. You may be able to end self-isolation when all 3 of these are true:  1. You have no fever without fever-reducing medicine. And   2. Your symptoms are better, such as cough or trouble breathing. And   3. You ve had 2 tests for COVID-19. The tests happened at least 24 hours apart, and both show that you don t have the virus. (If no tests are available, your care team may tell you to follow the rules for normally healthy people above.)  When you return to public settings  When you are well enough to go outside your home, follow the CDC s guidance on cloth face masks.    Anyone over age 2 should wear a face mask in public, especially when it's hard to socially distance. This includes public transit, public protests and marches, and crowded stores, bars, and restaurants.    Face masks are most likely to reduce the spread of COVID-19 when they are widely used by people who are out in the public.  Certain people should not wear a face covering. These include:     Children under 2 years old    Anyone with a health, developmental, or mental health condition that can be made worse by wearing a mask    Anyone who is unconscious or unable to remove the face covering without help. See the CDC's guidance on who should not wear a face mask.  When to call your care team  Call your care team right away if a sick person has any of these:    Trouble breathing    Pain or pressure in chest  If a sick person has any of these, call 911:    Trouble breathing that gets worse    Pain or pressure in chest that gets worse    Blue tint to lips or face    Fast or irregular heartbeat    Confusion or trouble  waking    Fainting or loss of consciousness    Coughing up blood  Going home from the hospital   If you have COVID-19 and were recently in the hospital:    Follow the instructions above for self-care and isolation.    Follow the hospital care team s instructions.    Ask questions if anything is unclear to you. Write down answers so you remember them.  Date last modified: 8/12/2020  [employer] last reviewed this educational content on 4/1/2020 2000-2020 [employer]. [address] [place] [address], [place] [address]. All rights reserved. This information is not intended as a substitute for professional medical care. Always follow your healthcare professional's instructions.

## 2020-09-24 NOTE — PATIENT INSTRUCTIONS
Patient Education     Coronavirus Disease 2019 (COVID-19): Caring for Yourself or Others   If you or a household member have symptoms of COVID-19, follow the guidelines below. This will help you manage symptoms and keep the virus from spreading.  If you have symptoms of COVID-19    Stay home and contact your care team. They will tell you what to do    Don t panic. Keep in mind that other illnesses can cause similar symptoms.    Stay away from work, school, and public places.    Limit physical contact with others in your home. Limit visitors. No kissing.    Clean surfaces you touch with disinfectant.    If you need to cough or sneeze, do it into a tissue. Then, throw the tissue into the trash. If you don't have tissues, cough or sneeze into the bend of your elbow.    Don t share food or personal items with people in your household. This includes items like eating and drinking utensils, towels, and bedding.    Wear a cloth face mask around other people. It should cover your nose and mouth. You may need to make your own mask using a bandana, T-shirt, or other cloth. See the CDC s instructions on how to make a mask.    If you need to go to a hospital or clinic, call ahead to let them know. Expect the care team to wear masks, gowns, gloves, and eye protection. You may be put in a separate room.    Follow all instructions from your care team.    If you have been diagnosed with COVID-19    Stay home and away from others, including other people in your home. (This is called self-isolation.) Don t leave home unless you need to get medical care. Don't go to work, school, or public places. Don't use buses, taxis, or other public transportation.    Follow all instructions from your care team.    If you need to go to a hospital or clinic, call ahead to let them know. Expect the care team to wear masks, gowns, gloves, and eye protection. You may be put in a separate room.    Wear a face mask over your nose and mouth. This is to  protect others from your germs. If you can t wear a mask, your caregivers should wear one. You may need to make your own mask using a bandana, T-shirt, or other cloth. See the CDC s instructions on how to make a mask.    Have no contact with pets and other animals.    Don t share food or personal items with people in your household. This includes items like eating and drinking utensils, towels, and bedding.    Don t share food or personal items with people in your household. This includes items like eating and drinking utensils, towels, and bedding.    If you need to cough or sneeze, do it into a tissue. Then, throw the tissue into the trash. If you don't have tissues, cough or sneeze into the bend of your elbow.    Wash your hands often.    Self-care at home   At this time, there is no medicine approved to prevent or treat COVID-19. Experts are testing different medicines, trying to find one that works.  So far, the most proven treatment is to support your body while it fights the virus.    Get extra rest.    Drink extra fluids (6 to 8 glasses of liquids each day), unless a doctor has told you not to. Ask your care team which fluids are best for you. Avoid fluids that contain caffeine or alcohol.    Taking over-the-counter (OTC) pain medicine to reduce pain and fever. Your care team will tell you which medicine to use.  If you ve been in the hospital for COVID-19, follow your care team s instructions. They will tell you when to stop self-isolation. They may also have you change positions to help your breathing (such as lying on your belly).  If a test showed that you have COVID-19, you may be asked to donate plasma after you ve recovered. (This is called COVID-19 convalescent plasma donation.) The plasma may contain antibodies to help fight the virus in others. Scientists are testing whether this might be a treatment in the future. For more information, talk to your care team.  Caring for a sick person     Follow  all instructions from the care team.    Wash your hands often.    Wear protective clothing as advised.    Make sure the sick person wears a mask. If they can't wear a mask, don't stay in the same room with them. If you must be in the same room, wear a face mask. Make sure the mask covers both the nose and mouth.    Keep track of the sick person s symptoms.    Clean surfaces often with disinfectant. This includes phones, kitchen counters, fridge door handles, bathroom surfaces, and others.    Don t let anyone share household items with the sick person. This includes eating and drinking tools, towels, sheets, and blankets.    Clean fabrics and laundry well.    Keep other people and pets away from the sick person.    When you can stop self-isolation  When you are sick with COVID-19, you should stay away from other people. This is called self-isolation. The rules for ending self-isolation depend on your health in general.  If you are normally healthy  You can stop self-isolation when all 3 of these are true:  1. You ve had no fever--and no medicine that reduces fever--for at least 24 hours. And   2. Your symptoms are better, such as cough or trouble breathing. And   3. At least 10 days have passed since your symptoms first started.  Talk with your care team before you leave home. They may tell you it s okay to leave, or they may give you different advice.  If you have a weak immune system  If you re being treated for cancer, have an immune disorder (such as HIV), or have had a transplant (organ or bone marrow), follow your care team s instructions. You may be able to end self-isolation when all 3 of these are true:  1. You have no fever without fever-reducing medicine. And   2. Your symptoms are better, such as cough or trouble breathing. And   3. You ve had 2 tests for COVID-19. The tests happened at least 24 hours apart, and both show that you don t have the virus. (If no tests are available, your care team may tell  you to follow the rules for normally healthy people above.)  When you return to public settings  When you are well enough to go outside your home, follow the CDC s guidance on cloth face masks.    Anyone over age 2 should wear a face mask in public, especially when it's hard to socially distance. This includes public transit, public protests and marches, and crowded stores, bars, and restaurants.    Face masks are most likely to reduce the spread of COVID-19 when they are widely used by people who are out in the public.  Certain people should not wear a face covering. These include:     Children under 2 years old    Anyone with a health, developmental, or mental health condition that can be made worse by wearing a mask    Anyone who is unconscious or unable to remove the face covering without help. See the CDC's guidance on who should not wear a face mask.  When to call your care team  Call your care team right away if a sick person has any of these:    Trouble breathing    Pain or pressure in chest  If a sick person has any of these, call 911:    Trouble breathing that gets worse    Pain or pressure in chest that gets worse    Blue tint to lips or face    Fast or irregular heartbeat    Confusion or trouble waking    Fainting or loss of consciousness    Coughing up blood  Going home from the hospital   If you have COVID-19 and were recently in the hospital:    Follow the instructions above for self-care and isolation.    Follow the hospital care team s instructions.    Ask questions if anything is unclear to you. Write down answers so you remember them.  Date last modified: 8/12/2020  [email or web address] last reviewed this educational content on 4/1/2020 2000-2020 [employer]. [address] [place] [address], [place] [address]. All rights reserved. This information is not intended as a substitute for professional medical care. Always follow your healthcare professional's instructions.

## 2020-09-25 DIAGNOSIS — J02.9 ACUTE PHARYNGITIS, UNSPECIFIED ETIOLOGY: ICD-10-CM

## 2020-09-25 PROCEDURE — U0003 INFECTIOUS AGENT DETECTION BY NUCLEIC ACID (DNA OR RNA); SEVERE ACUTE RESPIRATORY SYNDROME CORONAVIRUS 2 (SARS-COV-2) (CORONAVIRUS DISEASE [COVID-19]), AMPLIFIED PROBE TECHNIQUE, MAKING USE OF HIGH THROUGHPUT TECHNOLOGIES AS DESCRIBED BY CMS-2020-01-R: HCPCS | Performed by: PHYSICIAN ASSISTANT

## 2020-09-26 LAB
SARS-COV-2 RNA SPEC QL NAA+PROBE: NOT DETECTED
SPECIMEN SOURCE: NORMAL

## 2021-01-14 ENCOUNTER — HEALTH MAINTENANCE LETTER (OUTPATIENT)
Age: 46
End: 2021-01-14

## 2021-03-02 ENCOUNTER — HOSPITAL ENCOUNTER (EMERGENCY)
Facility: CLINIC | Age: 46
Discharge: HOME | End: 2021-03-02
Attending: EMERGENCY MEDICINE | Admitting: EMERGENCY MEDICINE
Payer: COMMERCIAL

## 2021-03-02 VITALS
SYSTOLIC BLOOD PRESSURE: 135 MMHG | BODY MASS INDEX: 31.92 KG/M2 | OXYGEN SATURATION: 99 % | HEART RATE: 79 BPM | RESPIRATION RATE: 18 BRPM | HEIGHT: 64 IN | WEIGHT: 187 LBS | DIASTOLIC BLOOD PRESSURE: 84 MMHG | TEMPERATURE: 97.7 F

## 2021-03-02 DIAGNOSIS — S46.912A MUSCLE STRAIN OF LEFT UPPER ARM, INITIAL ENCOUNTER: ICD-10-CM

## 2021-03-02 DIAGNOSIS — M54.10 RADICULOPATHY AFFECTING UPPER EXTREMITY: ICD-10-CM

## 2021-03-02 LAB
ANION GAP SERPL CALCULATED.3IONS-SCNC: 5 MMOL/L (ref 3–14)
BASOPHILS # BLD AUTO: 0.1 10E9/L (ref 0–0.2)
BASOPHILS NFR BLD AUTO: 0.6 %
BUN SERPL-MCNC: 9 MG/DL (ref 7–30)
CALCIUM SERPL-MCNC: 8.7 MG/DL (ref 8.5–10.1)
CHLORIDE SERPL-SCNC: 109 MMOL/L (ref 94–109)
CO2 SERPL-SCNC: 27 MMOL/L (ref 20–32)
CREAT SERPL-MCNC: 0.64 MG/DL (ref 0.52–1.04)
DIFFERENTIAL METHOD BLD: NORMAL
EOSINOPHIL # BLD AUTO: 0.1 10E9/L (ref 0–0.7)
EOSINOPHIL NFR BLD AUTO: 0.8 %
ERYTHROCYTE [DISTWIDTH] IN BLOOD BY AUTOMATED COUNT: 13.4 % (ref 10–15)
GFR SERPL CREATININE-BSD FRML MDRD: >90 ML/MIN/{1.73_M2}
GLUCOSE SERPL-MCNC: 109 MG/DL (ref 70–99)
HCT VFR BLD AUTO: 39 % (ref 35–47)
HGB BLD-MCNC: 12.9 G/DL (ref 11.7–15.7)
IMM GRANULOCYTES # BLD: 0.1 10E9/L (ref 0–0.4)
IMM GRANULOCYTES NFR BLD: 0.9 %
LYMPHOCYTES # BLD AUTO: 2.4 10E9/L (ref 0.8–5.3)
LYMPHOCYTES NFR BLD AUTO: 27.4 %
MCH RBC QN AUTO: 30 PG (ref 26.5–33)
MCHC RBC AUTO-ENTMCNC: 33.1 G/DL (ref 31.5–36.5)
MCV RBC AUTO: 91 FL (ref 78–100)
MONOCYTES # BLD AUTO: 0.7 10E9/L (ref 0–1.3)
MONOCYTES NFR BLD AUTO: 7.6 %
NEUTROPHILS # BLD AUTO: 5.5 10E9/L (ref 1.6–8.3)
NEUTROPHILS NFR BLD AUTO: 62.7 %
NRBC # BLD AUTO: 0 10*3/UL
NRBC BLD AUTO-RTO: 0 /100
PLATELET # BLD AUTO: 226 10E9/L (ref 150–450)
POTASSIUM SERPL-SCNC: 3.7 MMOL/L (ref 3.4–5.3)
RBC # BLD AUTO: 4.3 10E12/L (ref 3.8–5.2)
SODIUM SERPL-SCNC: 141 MMOL/L (ref 133–144)
TROPONIN I SERPL-MCNC: <0.015 UG/L (ref 0–0.04)
WBC # BLD AUTO: 8.8 10E9/L (ref 4–11)

## 2021-03-02 PROCEDURE — 93005 ELECTROCARDIOGRAM TRACING: CPT

## 2021-03-02 PROCEDURE — 85025 COMPLETE CBC W/AUTO DIFF WBC: CPT | Performed by: EMERGENCY MEDICINE

## 2021-03-02 PROCEDURE — 84484 ASSAY OF TROPONIN QUANT: CPT | Performed by: EMERGENCY MEDICINE

## 2021-03-02 PROCEDURE — 80048 BASIC METABOLIC PNL TOTAL CA: CPT | Performed by: EMERGENCY MEDICINE

## 2021-03-02 PROCEDURE — 99285 EMERGENCY DEPT VISIT HI MDM: CPT

## 2021-03-02 ASSESSMENT — ENCOUNTER SYMPTOMS
SORE THROAT: 0
COUGH: 0
NAUSEA: 1
ARTHRALGIAS: 1
SHORTNESS OF BREATH: 1
FEVER: 0
NECK PAIN: 1

## 2021-03-02 ASSESSMENT — MIFFLIN-ST. JEOR: SCORE: 1468.23

## 2021-03-03 LAB — INTERPRETATION ECG - MUSE: NORMAL

## 2021-03-03 NOTE — ED PROVIDER NOTES
"  History   Chief Complaint:  Left Shoulder Pain     The history is provided by the patient.     Di Garcia is a 45 year old, otherwise healthy female who presents for evaluation of left shoulder pain. As she was getting ready for bed yesterday, she reports the onset of \"twinges\" in her left shoulder that would radiate down into her arm and scapula, as well as up into the left side of her neck and even her jaw. Throughout the night, she reports these twinges would wake her up periodically and so she tried stretching and taking hot showers with some relief. This morning, the shoulder pain was still present and it has not abated all day long. She reports stretching does provide some relief and she denies any particular triggers for the pain. Given the prolonged nature of this pain and no prior history of it, she decided to present to the ED today. Currently, she reports the left shoulder pain is present. When the pain is more severe, she reports feeling mildly short of breath and nauseated but she is unsure it that could be attributed to anxiety. She denies ever feeling any chest discomfort or pleuritic pain with these symptoms. She is relatively active at home and reports this shoulder pain was not exacerbated by climbing stairs or other exertional activities today. She also denies experiencing acute chest pain or shortness of breath provoked by that exertion. She is also not able to recall performing any new lifting, twisting, or other abnormal activities before yesterday which could have triggered this pain. She further denies recent cough, fever, or other infectious symptoms. She denies personal history of hypertension, hyperlipidemia, diabetes, heart disease, or tobacco use. She also denies any family history of coronary artery disease or sudden cardiac death. Additionally, she does report working from home and has been social distancing appropriately.     Allergies:  Bactrim   Nalbuphine    Medications:  " "  Mirena IUD     Past Medical History:    Ovarian cysts   Endometrial mass   Intramural fibroids   HELLP syndrome   Severe preeclampsia     Past Surgical History:    D&C  Hysteroscopy, IUD insertion, ovarian cystectomy, right salpingectomy     Family History:    She denies family history of heart disease or cardiac events, but does report a history of stroke.     Social History:  Presents unaccompanied to the ED  She denies tobacco use.   She has a 6 year old.   She works from home.     Review of Systems   Constitutional: Negative for fever.   HENT: Negative for congestion and sore throat.    Respiratory: Positive for shortness of breath (intermittent). Negative for cough.    Cardiovascular: Negative for chest pain.   Gastrointestinal: Positive for nausea (intermittent).   Musculoskeletal: Positive for arthralgias (L. shoulder) and neck pain (L sided).   All other systems reviewed and are negative.    Physical Exam     Patient Vitals for the past 24 hrs:   BP Temp Temp src Pulse Resp SpO2 Height Weight   03/02/21 2231 135/84 -- -- 79 18 99 % -- --   03/02/21 2215 (!) 140/105 -- -- -- 18 100 % -- --   03/02/21 1940 (!) 144/103 97.7  F (36.5  C) Temporal 80 20 98 % 1.626 m (5' 4\") 84.8 kg (187 lb)        Physical Exam  General: Alert, no acute distress; well appearing  Neuro:  PERRL.  EOMI.  No focal deficits; SILT to axillary/radial/ulnar/median nerve distributions.  5/5  strength bilaterally.  HEENT:  Moist mucous membranes. Conjunctiva normal.   CV:  RRR, no m/r/g, skin warm and well perfused; 2+ bilateral radial pulses  Pulm:  CTAB, no wheezes/ronchi/rales.  No acute distress, breathing comfortably  GI:  Soft, nontender, nondistended.  No rebound or guarding.  Normal bowel sounds  MSK:  Moving all extremities.  No focal areas of edema, erythema; reproducible point tenderness/pain to posterior left upper arm.  No cervical midline tenderness.  Skin:  WWP, no rashes, no lower extremity edema, skin color normal, " no diaphoresis    Emergency Department Course     ECG  ECG taken at 1945, ECG read at 2213  Normal sinus rhythm with sinus arrhythmia.  Normal ECG.  Rate 74 bpm. NM interval 140 ms. QRS duration 92 ms. QT/QTc 400/444 ms. P-R-T axes 56 -1 35.     Laboratory:  CBC: WBC: 8.8, HGB: 12.9, PLT: 226  BMP: Glucose 109 (H), o/w WNL (Creatinine: 0.64)    Troponin (Collected 2009): <0.015     Emergency Department Course:    Reviewed:  I reviewed the patient's nursing notes, vitals, past medical records, and Care Everywhere.     Assessments:  2214: I performed an exam of the patient, as documented above. History obtained and the results of her ED work up discussed as well. Based on the results and my exam findings, I also discussed my recommendations for home management of her symptoms and outpatient follow-up.     Disposition:  The patient was discharged to home.     Impression & Plan      Medical Decision Making:   Di Garcia is a 45 year old female who presents with radiating left shoulder/arm pain.  Please see details above for HPI and exam.  Vitals are stable on arrival.  Although ACS considered, patient denies chest pain.  Other things considered included cervical radiculopathy, nerve impingement, muscle strain/spasm amongst other things.  She has had intermittent shortness of breath but she is low risk for PE and is PERC negative.  I also doubt pneumonia or pneumothorax.  EKG shows sinus rhythm without any acute ST changes concerning for ischemia or infarct.  Troponin is within normal limits despite greater than 6 hours of unrelenting symptoms.  Patient's heart score is low.  I do not feel chest x-ray is indicated at this time.  She does have reproducible point tenderness to the left upper arm raising the suspicion for musculoskeletal etiology versus nerve impingement for her symptoms.  Overall, her work-up is reassuring and I do not find any life-threatening cause for her symptomatology.  With reasonable  clinical certainty, I feel that she is safe to discharge home.  Plan for Tylenol/ibuprofen, ice/heat with close follow-up with PCP as discussed.  I also discussed the signs and symptoms of prompt urgent return to the ER.  All questions answered prior to discharge.    Diagnosis:     ICD-10-CM    1. Radiculopathy affecting upper extremity  M54.10    2. Muscle strain of left upper arm, initial encounter  S46.912A        Scribe Disclosure:  I, Gladys Khan, am serving as a scribe on 3/2/2021 at 10:13 PM to personally document services performed by Walt Ovalles MD based on my observations and the provider's statements to me.      3/2/2021   EMERGENCY DEPARTMENT     Walt Ovalles MD  03/03/21 7614

## 2021-03-03 NOTE — ED TRIAGE NOTES
"Left shoulder pain radiating into back and bilateral shoulder blades. Some \"heartburn\" with it as well and some SOB. Says pain goes into left side of neck as well. Denies hx of cardiac problems.   "

## 2021-03-14 ENCOUNTER — HEALTH MAINTENANCE LETTER (OUTPATIENT)
Age: 46
End: 2021-03-14

## 2021-03-16 ENCOUNTER — HOSPITAL ENCOUNTER (EMERGENCY)
Facility: CLINIC | Age: 46
Discharge: HOME | End: 2021-03-16
Attending: EMERGENCY MEDICINE | Admitting: EMERGENCY MEDICINE
Payer: COMMERCIAL

## 2021-03-16 ENCOUNTER — APPOINTMENT (OUTPATIENT)
Dept: GENERAL RADIOLOGY | Facility: CLINIC | Age: 46
End: 2021-03-16
Attending: EMERGENCY MEDICINE
Payer: COMMERCIAL

## 2021-03-16 VITALS
TEMPERATURE: 97.7 F | RESPIRATION RATE: 16 BRPM | DIASTOLIC BLOOD PRESSURE: 89 MMHG | HEART RATE: 101 BPM | SYSTOLIC BLOOD PRESSURE: 126 MMHG | OXYGEN SATURATION: 96 %

## 2021-03-16 DIAGNOSIS — R07.9 CHEST PAIN, UNSPECIFIED TYPE: ICD-10-CM

## 2021-03-16 LAB
ANION GAP SERPL CALCULATED.3IONS-SCNC: 7 MMOL/L (ref 3–14)
BASOPHILS # BLD AUTO: 0.1 10E9/L (ref 0–0.2)
BASOPHILS NFR BLD AUTO: 0.8 %
BUN SERPL-MCNC: 12 MG/DL (ref 7–30)
CALCIUM SERPL-MCNC: 8.8 MG/DL (ref 8.5–10.1)
CHLORIDE SERPL-SCNC: 109 MMOL/L (ref 94–109)
CO2 SERPL-SCNC: 22 MMOL/L (ref 20–32)
CREAT SERPL-MCNC: 0.66 MG/DL (ref 0.52–1.04)
D DIMER PPP FEU-MCNC: 0.5 UG/ML FEU (ref 0–0.5)
DIFFERENTIAL METHOD BLD: NORMAL
EOSINOPHIL # BLD AUTO: 0.2 10E9/L (ref 0–0.7)
EOSINOPHIL NFR BLD AUTO: 1.7 %
ERYTHROCYTE [DISTWIDTH] IN BLOOD BY AUTOMATED COUNT: 13.3 % (ref 10–15)
GFR SERPL CREATININE-BSD FRML MDRD: >90 ML/MIN/{1.73_M2}
GLUCOSE SERPL-MCNC: 110 MG/DL (ref 70–99)
HCT VFR BLD AUTO: 39.5 % (ref 35–47)
HGB BLD-MCNC: 13.5 G/DL (ref 11.7–15.7)
IMM GRANULOCYTES # BLD: 0.1 10E9/L (ref 0–0.4)
IMM GRANULOCYTES NFR BLD: 0.6 %
INTERPRETATION ECG - MUSE: NORMAL
LYMPHOCYTES # BLD AUTO: 2.6 10E9/L (ref 0.8–5.3)
LYMPHOCYTES NFR BLD AUTO: 29.5 %
MCH RBC QN AUTO: 30.8 PG (ref 26.5–33)
MCHC RBC AUTO-ENTMCNC: 34.2 G/DL (ref 31.5–36.5)
MCV RBC AUTO: 90 FL (ref 78–100)
MONOCYTES # BLD AUTO: 0.7 10E9/L (ref 0–1.3)
MONOCYTES NFR BLD AUTO: 7.9 %
NEUTROPHILS # BLD AUTO: 5.2 10E9/L (ref 1.6–8.3)
NEUTROPHILS NFR BLD AUTO: 59.5 %
NRBC # BLD AUTO: 0 10*3/UL
NRBC BLD AUTO-RTO: 0 /100
PLATELET # BLD AUTO: 250 10E9/L (ref 150–450)
POTASSIUM SERPL-SCNC: 4.2 MMOL/L (ref 3.4–5.3)
RBC # BLD AUTO: 4.38 10E12/L (ref 3.8–5.2)
SODIUM SERPL-SCNC: 138 MMOL/L (ref 133–144)
TROPONIN I SERPL-MCNC: <0.015 UG/L (ref 0–0.04)
TROPONIN I SERPL-MCNC: <0.015 UG/L (ref 0–0.04)
WBC # BLD AUTO: 8.7 10E9/L (ref 4–11)

## 2021-03-16 PROCEDURE — 250N000009 HC RX 250: Performed by: EMERGENCY MEDICINE

## 2021-03-16 PROCEDURE — 85025 COMPLETE CBC W/AUTO DIFF WBC: CPT | Performed by: EMERGENCY MEDICINE

## 2021-03-16 PROCEDURE — 84484 ASSAY OF TROPONIN QUANT: CPT | Performed by: EMERGENCY MEDICINE

## 2021-03-16 PROCEDURE — 71046 X-RAY EXAM CHEST 2 VIEWS: CPT

## 2021-03-16 PROCEDURE — 85379 FIBRIN DEGRADATION QUANT: CPT | Performed by: EMERGENCY MEDICINE

## 2021-03-16 PROCEDURE — 99285 EMERGENCY DEPT VISIT HI MDM: CPT | Mod: 25

## 2021-03-16 PROCEDURE — 80048 BASIC METABOLIC PNL TOTAL CA: CPT | Performed by: EMERGENCY MEDICINE

## 2021-03-16 PROCEDURE — 250N000013 HC RX MED GY IP 250 OP 250 PS 637: Performed by: EMERGENCY MEDICINE

## 2021-03-16 RX ORDER — ONDANSETRON 2 MG/ML
4 INJECTION INTRAMUSCULAR; INTRAVENOUS ONCE
Status: DISCONTINUED | OUTPATIENT
Start: 2021-03-16 | End: 2021-03-16 | Stop reason: HOSPADM

## 2021-03-16 RX ADMIN — LIDOCAINE HYDROCHLORIDE 30 ML: 20 SOLUTION ORAL; TOPICAL at 05:02

## 2021-03-16 ASSESSMENT — ENCOUNTER SYMPTOMS
COUGH: 0
FEVER: 0
VOMITING: 0
DIZZINESS: 0
DIARRHEA: 0
CHILLS: 0
ABDOMINAL PAIN: 0
NAUSEA: 1
WOUND: 0
SHORTNESS OF BREATH: 0
LIGHT-HEADEDNESS: 0

## 2021-03-16 NOTE — ED PROVIDER NOTES
History     Chief Complaint:  Chest Pain    HPI  History is provided by the patient.    Di Garcia is a 45 year old female who presents to the emergency department for evaluation of chest pain. Patient woke up at 1230am this morning with midsternal chest pain that radiates into her mid-back. She thought she had heartburn and tried taking Tums to no relief. She feels nauseous but has not vomited. No abdominal pain, fever, cough, diarrhea, leg pain, and leg swelling. She denies eating spicy food or alcohol use last night but does note drinking some orange juice.  She has no history of cardiac disease.    Cardiac Risk Factors:  SEX:              female  Tobacco:              Negative  Hypertension:       Negative  Diabetes:             Negative  Lipids:                Negative  Personal history:  Negative  Family History:       Positive    PE and DVT Risk Factors:  Personal hx of PE/DVT:  Negative  Family hx of PE/DVT:   Negative  Recent travel:    Negative  Recent surgery:   Negative  Other immobilizations:  Negative  Hormone use:   Negative      Review of Systems   Constitutional: Negative for chills and fever.   Respiratory: Negative for cough and shortness of breath.    Cardiovascular: Positive for chest pain. Negative for leg swelling.   Gastrointestinal: Positive for nausea. Negative for abdominal pain, diarrhea and vomiting.   Skin: Negative for rash and wound.   Neurological: Negative for dizziness and light-headedness.   All other systems reviewed and are negative.    Allergies:  Bactrim [Sulfamethoxazole W/Trimethoprim]  Nalbuphine    Medications:    Epinephrine  Mirena    Past Medical History:    Ovarian cyst  Preeclampsia  Depression  Anxiety  HELLP syndrome    Past Surgical History:    D&C  Right salpingectomy  Ovarian cyst removal    Family History:    Heart disease    Social History:  The patient presents to the emergency department alone.  Smoking Status: No    Physical Exam     Patient Vitals  for the past 24 hrs:   BP Temp Temp src Pulse Resp SpO2   03/16/21 0530 126/89 -- -- 101 -- 96 %   03/16/21 0515 113/72 -- -- 89 -- 95 %   03/16/21 0500 124/85 -- -- 96 -- 98 %   03/16/21 0445 115/69 -- -- 81 -- 94 %   03/16/21 0430 111/75 -- -- 81 -- 96 %   03/16/21 0415 128/79 -- -- 86 -- 98 %   03/16/21 0158 (!) 148/103 -- -- -- -- --   03/16/21 0157 (!) 159/107 97.7  F (36.5  C) Temporal 102 16 99 %         Physical Exam  Constitutional: Well appearing.  HEENT: Atraumatic.    Moist mucous membranes.  Neck: Soft.  Supple.  No JVD.  Cardiac: Regular rate and rhythm.  No murmur or rub.  Respiratory: Clear to auscultation bilaterally.  No respiratory distress.    Abdomen: Soft and nontender.   Nondistended.  Musculoskeletal: No edema.  Normal range of motion.  Neurologic: Alert and oriented x3.  Normal tone and bulk. Normal gait.  Skin: No rashes.  No edema.  Psych: Normal affect.  Normal behavior.      Emergency Department Course   ECG  ECG taken at 203, ECG read at 206  Normal sinus rhythm. Normal ECG.  No prior EKG.   Rate 93 bpm. NV interval 136 ms. QRS duration 90 ms. QT/QTc 372/462 ms. P-R-T axes 31 -14 30.     Imaging:  XR Chest 2 views:   Negative chest. as per radiology.    Laboratory:  CBC: WBC: 8.7, HGB: 13.5, PLT: 250  BMP: Glucose 110 (H), o/w WNL (Creatinine: 0.66)  Troponin (Collected 222): <0.015  Repeat Troponin (Collected 0616): <0.015   D-dimer: 0.5    Emergency Department Course:  Reviewed:  I reviewed the patient's nursing notes, vitals, past medical records, Care Everywhere.     Assessments:  507 I assessed the patient. Exam findings described above.    620 I reassessed and updated the patient.     Interventions:  502 GI cocktail 30 mL Oral    Disposition:  Discharged to home.    Impression & Plan    Medical Decision Making:  Di Garcia is a 45-year-old woman who is afebrile and hemodynamically stable.  Her EKG demonstrates a normal sinus rhythm with no acute ischemic changes on my  read.  Initial troponin is within normal limits.  She is low risk Wells criteria for PE and a D-dimer is within normal limits, essentially excluding a PE.  Chest x-ray is unremarkable for acute disease on my read with no evidence of mediastinal widening.  I discussed the results with her.  She had some mild improvement with GI cocktail.  Given the onset of symptoms within 6 hours, I discussed a delta troponin and she was agreeable.  This is again within normal limits.  She has a low heart score and I think and safely follow-up as an outpatient and discharged home and she is in agreement feels countable this plan.  I see no emergent or life-threatening condition today that require hospitalization.  Discussed supportive care at home and return precautions were given.  She was in no distress at time of discharge.    Diagnosis:    ICD-10-CM    1. Chest pain, unspecified type  R07.9          Barrera Lackey  3/16/2021   EMERGENCY DEPARTMENT  Scribe Disclosure:  I, Barrera Lackey, am serving as a scribe at 5:07 AM on 3/16/2021 to document services personally performed by Moiz Cruz MD based on my observations and the provider's statements to me.          Moiz Cruz MD  03/16/21 1202

## 2021-03-16 NOTE — ED TRIAGE NOTES
Woke up with midsternal chest pain and pressure that goes through into her back.  Also reports nausea and diaphoresis. Took 1 Ativan and 2 Tums PTA.

## 2021-09-12 ENCOUNTER — HOSPITAL ENCOUNTER (EMERGENCY)
Facility: CLINIC | Age: 46
Discharge: HOME | End: 2021-09-12
Attending: EMERGENCY MEDICINE | Admitting: EMERGENCY MEDICINE
Payer: COMMERCIAL

## 2021-09-12 VITALS
DIASTOLIC BLOOD PRESSURE: 94 MMHG | SYSTOLIC BLOOD PRESSURE: 138 MMHG | HEART RATE: 66 BPM | OXYGEN SATURATION: 99 % | TEMPERATURE: 98.4 F | RESPIRATION RATE: 20 BRPM

## 2021-09-12 DIAGNOSIS — R07.89 NON-CARDIAC CHEST PAIN: ICD-10-CM

## 2021-09-12 LAB
ANION GAP SERPL CALCULATED.3IONS-SCNC: 4 MMOL/L (ref 3–14)
BASOPHILS # BLD AUTO: 0.1 10E3/UL (ref 0–0.2)
BASOPHILS NFR BLD AUTO: 1 %
BUN SERPL-MCNC: 13 MG/DL (ref 7–30)
CALCIUM SERPL-MCNC: 8.4 MG/DL (ref 8.5–10.1)
CHLORIDE BLD-SCNC: 110 MMOL/L (ref 94–109)
CO2 SERPL-SCNC: 26 MMOL/L (ref 20–32)
CREAT SERPL-MCNC: 0.62 MG/DL (ref 0.52–1.04)
EOSINOPHIL # BLD AUTO: 0.1 10E3/UL (ref 0–0.7)
EOSINOPHIL NFR BLD AUTO: 2 %
ERYTHROCYTE [DISTWIDTH] IN BLOOD BY AUTOMATED COUNT: 13.2 % (ref 10–15)
GFR SERPL CREATININE-BSD FRML MDRD: >90 ML/MIN/1.73M2
GLUCOSE BLD-MCNC: 102 MG/DL (ref 70–99)
HCG SERPL QL: NEGATIVE
HCT VFR BLD AUTO: 38.5 % (ref 35–47)
HGB BLD-MCNC: 13 G/DL (ref 11.7–15.7)
IMM GRANULOCYTES # BLD: 0.1 10E3/UL
IMM GRANULOCYTES NFR BLD: 1 %
LYMPHOCYTES # BLD AUTO: 1.9 10E3/UL (ref 0.8–5.3)
LYMPHOCYTES NFR BLD AUTO: 23 %
MCH RBC QN AUTO: 29.7 PG (ref 26.5–33)
MCHC RBC AUTO-ENTMCNC: 33.8 G/DL (ref 31.5–36.5)
MCV RBC AUTO: 88 FL (ref 78–100)
MONOCYTES # BLD AUTO: 0.7 10E3/UL (ref 0–1.3)
MONOCYTES NFR BLD AUTO: 8 %
NEUTROPHILS # BLD AUTO: 5.4 10E3/UL (ref 1.6–8.3)
NEUTROPHILS NFR BLD AUTO: 65 %
NRBC # BLD AUTO: 0 10E3/UL
NRBC BLD AUTO-RTO: 0 /100
PLATELET # BLD AUTO: 231 10E3/UL (ref 150–450)
POTASSIUM BLD-SCNC: 3.5 MMOL/L (ref 3.4–5.3)
RBC # BLD AUTO: 4.38 10E6/UL (ref 3.8–5.2)
SODIUM SERPL-SCNC: 140 MMOL/L (ref 133–144)
TROPONIN I SERPL-MCNC: <0.015 UG/L (ref 0–0.04)
WBC # BLD AUTO: 8.3 10E3/UL (ref 4–11)

## 2021-09-12 PROCEDURE — 84484 ASSAY OF TROPONIN QUANT: CPT | Performed by: EMERGENCY MEDICINE

## 2021-09-12 PROCEDURE — 80048 BASIC METABOLIC PNL TOTAL CA: CPT | Performed by: EMERGENCY MEDICINE

## 2021-09-12 PROCEDURE — 93005 ELECTROCARDIOGRAM TRACING: CPT

## 2021-09-12 PROCEDURE — 84703 CHORIONIC GONADOTROPIN ASSAY: CPT | Performed by: EMERGENCY MEDICINE

## 2021-09-12 PROCEDURE — 36415 COLL VENOUS BLD VENIPUNCTURE: CPT | Performed by: EMERGENCY MEDICINE

## 2021-09-12 PROCEDURE — 99284 EMERGENCY DEPT VISIT MOD MDM: CPT

## 2021-09-12 PROCEDURE — 85025 COMPLETE CBC W/AUTO DIFF WBC: CPT | Performed by: EMERGENCY MEDICINE

## 2021-09-12 NOTE — ED PROVIDER NOTES
Visit Date: 09/12/2021    CHIEF COMPLAINT:  Chest pain.    HISTORY OF PRESENT ILLNESS:  This is a 45-year-old female who presents with intermittent chest pain.  She first noticed this when she was out on a walk with a friend and had pain behind her scapula on both sides.  She seemed to be able to stretch this out.  Then upon lying down tonight, she had what she describes as the worst reflux of her life.  She describes it as a burning pain in her central chest.  She tried to walk off her anxiety as she was somewhat worried about her heart on the driveway.  While pacing on the driveway, she noted that the pain was getting worse in her chest, which was concerning to her and prompted her presentation to the ER.  No fevers.  No shortness of breath.  No vomiting, sweating or abdominal pain.    PAST MEDICAL HISTORY:  History of HELLP syndrome.    PAST SURGICAL HISTORY:  [place] teeth extraction.    MEDICATIONS:  Mirena IUD.    ALLERGIES:     1.  BACTRIM.  2.  CAT HAIR EXTRACT.  3.  ADHESIVE TAPE.  4.  NALBUPHINE.    SOCIAL HISTORY:  The patient presents to the Emergency Department by herself.  She does not smoke.    REVIEW OF SYSTEMS:  A pertinent 10-point review of systems is negative, except for that noted in the HPI.    PHYSICAL EXAMINATION:    GENERAL:  The patient is sitting up in a chair in triage comfortably.  VITAL SIGNS:  Blood pressure 138/94, heart rate 66, respiratory rate 20, oxygen 99% on room air, temperature 98.4 degrees.  HEENT:  Atraumatic.  NECK:  No JVD.  CARDIOVASCULAR:  Regular rhythm, normal rate.  No murmurs.  RESPIRATORY:  Clear to auscultation bilaterally without wheezes or rales.  GASTROINTESTINAL:  Soft, nondistended, nontender.  MUSCULOSKELETAL:  No edema in lower extremities.  SKIN:  There is no pertinent skin findings.  PSYCHIATRIC:  The patient has normal mood and affect.  NEUROLOGIC:  The patient is alert and oriented x 4.  Normal gait and strength.    LABORATORY EVALUATION AND DIAGNOSTIC  STUDIES:  A 12-lead electrocardiogram shows normal sinus rhythm with a ventricular rate of 78 beats per minute.  Normal axis.  Normal intervals. No ST elevation or depression or other signs of ischemia.  No ectopy.      LABORATORY:  Basic metabolic panel and CBC are normal.  Serum pregnancy test is negative.  Troponin is undetectable.    EMERGENCY DEPARTMENT COURSE AND MEDICAL DECISION MAKING:  This is a pleasant 45-year-old female who presents with atypical chest pain, though there was some exertional component to it.  It does sound more like reflux as it is worse positionally lying flat.  At this time, her EKG, troponin and other workup is reassuring.  I doubt pulmonary embolism, pneumonia, pneumothorax, aortic dissection.  No fevers or concern for an infectious etiology.  She will be discharged home in stable condition with plans to follow up with her primary care physician to discuss stress testing.    DIAGNOSIS:  Noncardiac chest pain.    PLAN OF CARE:  As above.    Ramesh Rojas MD        D: 2021   T: 2021   MT: CAROL    Name:     STACEY LINBuzz  MRN:      [id number]        Account:    [id number]   :      1975           Visit Date: 2021     Document: [id number]

## 2021-09-13 LAB
ATRIAL RATE - MUSE: 78 BPM
DIASTOLIC BLOOD PRESSURE - MUSE: NORMAL MMHG
INTERPRETATION ECG - MUSE: NORMAL
P AXIS - MUSE: 48 DEGREES
PR INTERVAL - MUSE: 142 MS
QRS DURATION - MUSE: 88 MS
QT - MUSE: 402 MS
QTC - MUSE: 458 MS
R AXIS - MUSE: -5 DEGREES
SYSTOLIC BLOOD PRESSURE - MUSE: NORMAL MMHG
T AXIS - MUSE: 24 DEGREES
VENTRICULAR RATE- MUSE: 78 BPM

## 2021-10-24 ENCOUNTER — HEALTH MAINTENANCE LETTER (OUTPATIENT)
Age: 46
End: 2021-10-24

## 2022-02-13 ENCOUNTER — HEALTH MAINTENANCE LETTER (OUTPATIENT)
Age: 47
End: 2022-02-13

## 2022-03-02 ENCOUNTER — HOSPITAL ENCOUNTER (EMERGENCY)
Facility: CLINIC | Age: 47
Discharge: HOME | End: 2022-03-02
Attending: EMERGENCY MEDICINE | Admitting: EMERGENCY MEDICINE
Payer: COMMERCIAL

## 2022-03-02 ENCOUNTER — APPOINTMENT (OUTPATIENT)
Dept: CT IMAGING | Facility: CLINIC | Age: 47
End: 2022-03-02
Attending: EMERGENCY MEDICINE
Payer: COMMERCIAL

## 2022-03-02 ENCOUNTER — APPOINTMENT (OUTPATIENT)
Dept: MRI IMAGING | Facility: CLINIC | Age: 47
End: 2022-03-02
Attending: EMERGENCY MEDICINE
Payer: COMMERCIAL

## 2022-03-02 VITALS
DIASTOLIC BLOOD PRESSURE: 83 MMHG | TEMPERATURE: 97.5 F | OXYGEN SATURATION: 98 % | RESPIRATION RATE: 10 BRPM | SYSTOLIC BLOOD PRESSURE: 147 MMHG | HEART RATE: 86 BPM

## 2022-03-02 DIAGNOSIS — R53.1 LEFT-SIDED WEAKNESS: ICD-10-CM

## 2022-03-02 DIAGNOSIS — E04.1 THYROID NODULE: ICD-10-CM

## 2022-03-02 DIAGNOSIS — R03.0 ELEVATED BLOOD PRESSURE READING WITHOUT DIAGNOSIS OF HYPERTENSION: ICD-10-CM

## 2022-03-02 LAB
ANION GAP SERPL CALCULATED.3IONS-SCNC: 5 MMOL/L (ref 3–14)
ATRIAL RATE - MUSE: 88 BPM
B-HCG FREE SERPL-ACNC: <5 IU/L (ref 0–5)
BASOPHILS # BLD AUTO: 0 10E3/UL (ref 0–0.2)
BASOPHILS NFR BLD AUTO: 1 %
BUN SERPL-MCNC: 15 MG/DL (ref 7–30)
CALCIUM SERPL-MCNC: 9.5 MG/DL (ref 8.5–10.1)
CHLORIDE BLD-SCNC: 108 MMOL/L (ref 94–109)
CO2 SERPL-SCNC: 24 MMOL/L (ref 20–32)
CREAT SERPL-MCNC: 0.6 MG/DL (ref 0.52–1.04)
DIASTOLIC BLOOD PRESSURE - MUSE: NORMAL MMHG
EOSINOPHIL # BLD AUTO: 0.1 10E3/UL (ref 0–0.7)
EOSINOPHIL NFR BLD AUTO: 1 %
ERYTHROCYTE [DISTWIDTH] IN BLOOD BY AUTOMATED COUNT: 13.2 % (ref 10–15)
GFR SERPL CREATININE-BSD FRML MDRD: >90 ML/MIN/1.73M2
GLUCOSE BLD-MCNC: 101 MG/DL (ref 70–99)
GLUCOSE BLDC GLUCOMTR-MCNC: 94 MG/DL (ref 70–99)
HCT VFR BLD AUTO: 41.7 % (ref 35–47)
HGB BLD-MCNC: 14.5 G/DL (ref 11.7–15.7)
IMM GRANULOCYTES # BLD: 0 10E3/UL
IMM GRANULOCYTES NFR BLD: 0 %
INTERPRETATION ECG - MUSE: NORMAL
LYMPHOCYTES # BLD AUTO: 2.2 10E3/UL (ref 0.8–5.3)
LYMPHOCYTES NFR BLD AUTO: 29 %
MCH RBC QN AUTO: 29.7 PG (ref 26.5–33)
MCHC RBC AUTO-ENTMCNC: 34.8 G/DL (ref 31.5–36.5)
MCV RBC AUTO: 85 FL (ref 78–100)
MONOCYTES # BLD AUTO: 0.6 10E3/UL (ref 0–1.3)
MONOCYTES NFR BLD AUTO: 8 %
NEUTROPHILS # BLD AUTO: 4.5 10E3/UL (ref 1.6–8.3)
NEUTROPHILS NFR BLD AUTO: 61 %
NRBC # BLD AUTO: 0 10E3/UL
NRBC BLD AUTO-RTO: 0 /100
P AXIS - MUSE: 45 DEGREES
PLATELET # BLD AUTO: 247 10E3/UL (ref 150–450)
POTASSIUM BLD-SCNC: 4 MMOL/L (ref 3.4–5.3)
PR INTERVAL - MUSE: 140 MS
QRS DURATION - MUSE: 90 MS
QT - MUSE: 382 MS
QTC - MUSE: 462 MS
R AXIS - MUSE: -16 DEGREES
RBC # BLD AUTO: 4.89 10E6/UL (ref 3.8–5.2)
SODIUM SERPL-SCNC: 137 MMOL/L (ref 133–144)
SYSTOLIC BLOOD PRESSURE - MUSE: NORMAL MMHG
T AXIS - MUSE: 36 DEGREES
TROPONIN I SERPL HS-MCNC: 4 NG/L
TSH SERPL DL<=0.005 MIU/L-ACNC: 1.55 MU/L (ref 0.4–4)
VENTRICULAR RATE- MUSE: 88 BPM
WBC # BLD AUTO: 7.4 10E3/UL (ref 4–11)

## 2022-03-02 PROCEDURE — 99207 PR NO BILLABLE SERVICE THIS VISIT: CPT | Performed by: NURSE PRACTITIONER

## 2022-03-02 PROCEDURE — 258N000003 HC RX IP 258 OP 636: Performed by: EMERGENCY MEDICINE

## 2022-03-02 PROCEDURE — 96361 HYDRATE IV INFUSION ADD-ON: CPT

## 2022-03-02 PROCEDURE — 84443 ASSAY THYROID STIM HORMONE: CPT | Performed by: EMERGENCY MEDICINE

## 2022-03-02 PROCEDURE — 70450 CT HEAD/BRAIN W/O DYE: CPT | Mod: XS

## 2022-03-02 PROCEDURE — G0426 INPT/ED TELECONSULT50: HCPCS | Performed by: PSYCHIATRY & NEUROLOGY

## 2022-03-02 PROCEDURE — 70553 MRI BRAIN STEM W/O & W/DYE: CPT

## 2022-03-02 PROCEDURE — 99285 EMERGENCY DEPT VISIT HI MDM: CPT | Mod: 25

## 2022-03-02 PROCEDURE — 85025 COMPLETE CBC W/AUTO DIFF WBC: CPT | Performed by: EMERGENCY MEDICINE

## 2022-03-02 PROCEDURE — A9585 GADOBUTROL INJECTION: HCPCS | Performed by: EMERGENCY MEDICINE

## 2022-03-02 PROCEDURE — 255N000002 HC RX 255 OP 636: Performed by: EMERGENCY MEDICINE

## 2022-03-02 PROCEDURE — 84484 ASSAY OF TROPONIN QUANT: CPT | Performed by: EMERGENCY MEDICINE

## 2022-03-02 PROCEDURE — 250N000011 HC RX IP 250 OP 636: Performed by: EMERGENCY MEDICINE

## 2022-03-02 PROCEDURE — 36415 COLL VENOUS BLD VENIPUNCTURE: CPT | Performed by: EMERGENCY MEDICINE

## 2022-03-02 PROCEDURE — 70496 CT ANGIOGRAPHY HEAD: CPT

## 2022-03-02 PROCEDURE — 93005 ELECTROCARDIOGRAM TRACING: CPT

## 2022-03-02 PROCEDURE — 82310 ASSAY OF CALCIUM: CPT | Performed by: EMERGENCY MEDICINE

## 2022-03-02 PROCEDURE — 99207 PR SC NO CHARGE VISIT: CPT | Performed by: PSYCHIATRY & NEUROLOGY

## 2022-03-02 PROCEDURE — 70498 CT ANGIOGRAPHY NECK: CPT

## 2022-03-02 PROCEDURE — 84702 CHORIONIC GONADOTROPIN TEST: CPT

## 2022-03-02 PROCEDURE — 96360 HYDRATION IV INFUSION INIT: CPT

## 2022-03-02 RX ORDER — IOPAMIDOL 755 MG/ML
500 INJECTION, SOLUTION INTRAVASCULAR ONCE
Status: COMPLETED | OUTPATIENT
Start: 2022-03-02 | End: 2022-03-02

## 2022-03-02 RX ORDER — GADOBUTROL 604.72 MG/ML
8.5 INJECTION INTRAVENOUS ONCE
Status: COMPLETED | OUTPATIENT
Start: 2022-03-02 | End: 2022-03-02

## 2022-03-02 RX ADMIN — SODIUM CHLORIDE 80 ML: 9 INJECTION, SOLUTION INTRAVENOUS at 13:01

## 2022-03-02 RX ADMIN — GADOBUTROL 8.5 ML: 604.72 INJECTION INTRAVENOUS at 14:49

## 2022-03-02 RX ADMIN — IOPAMIDOL 70 ML: 755 INJECTION, SOLUTION INTRAVENOUS at 13:02

## 2022-03-02 ASSESSMENT — ENCOUNTER SYMPTOMS
HEADACHES: 1
WEAKNESS: 1
SPEECH DIFFICULTY: 1

## 2022-03-02 NOTE — ED PROVIDER NOTES
History   Chief Complaint:  Hypertension       HPI   Di Garcia is a 46 year old female with history of anxiety who presents with high blood pressure. The patient states she has been having elevated blood pressure since November (138/89 in November, more recently 167/101). She states she has seen her PCP, though there was hesitancy to put her on medications.  She has also followed with OB, who expressed concern for her BP.   Here in the ER, she notes a right sided headache, which has been off and on for the past 1 month.  Earlier today, patient reports she dropped her son off at school around 9 AM.  Upon returning home, she noted the recurrence of this right-sided headache.  She also noted blurriness to her right eye, though denied any vision loss or other visual disturbances.  She also noted some difficulties thinking of certain words, though denied any slurred speech.  Shortly thereafter she also noted that her left hand was not hitting the correct keys on the keyboard, and also noted that her left foot felt clumsy when walking.  She has not experienced similar symptoms in the past.  She presents to the ED for further evaluation.  Overall, her visual, speech, and extremity symptoms have improved.  She denies any chest pain, palpitations.  No prior history of CVA or TIA.  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  Patient with prior history of migraines.       Review of Systems   Eyes: Positive for visual disturbance (right eye).   Cardiovascular: Negative for chest pain.   Neurological: Positive for speech difficulty, weakness (left leg and arm) and headaches (right sided).   All other systems reviewed and are negative.    Allergies:  Bactrim  Cat Hair Extract  Nalbuphine  Adhesive Tape    Medications:  Augmentin  Advil/Motrin  Aygestin    Past Medical  History:     HELLP syndrome    Terminal ileitis without complication  Intramural fibroids  Endometrial mass  Bilateral ovarian cyst  Thyroid nodule    Past Surgical History:    [place] teeth removal   Dilation and curettage    Family History:    Father: Alcohol abuse    Social History:  The patient presents alone. Never smoker.    Physical Exam     Patient Vitals for the past 24 hrs:   BP Temp Temp src Pulse Resp SpO2   03/02/22 1530 (!) 147/83 -- -- 86 10 98 %   03/02/22 1415 -- -- -- 85 14 --   03/02/22 1400 -- -- -- 93 16 --   03/02/22 1345 -- -- -- 93 13 99 %   03/02/22 1245 (!) 145/87 -- -- -- -- --   03/02/22 1200 (!) 174/107 97.5  F (36.4  C) Temporal 87 20 100 %       Physical Exam  General:              Well-nourished              Speaking in full sentences  Eyes:              Conjunctiva without injection or scleral icterus              Pupils: 3 on R and 3 on L, round, reactive to light and accomodation  ENT:              Moist mucous membranes              Posterior oropharynx clear without erythema or exudate  Neck:              Supple with full ROM  Resp:              Lungs CTAB              No crackles, wheezing or audible rubs              Good air movement  CV:                    Normal rate, regular rhythm              S1 and S2 present              No murmur, gallop or rub  GI:              BS present              Abdomen soft without distention              Non-tender to light and deep palpation              No guarding or rebound tenderness  Skin:              Warm, dry, well perfused              No rashes or open wounds on exposed skin  MSK:              Moves all extremities              No focal deformities or swelling  Neuro:              Alert              CN III-XII intact              5/5 strength bilaterally to hand , elbow flexion/extension              5/5 strength bilaterally to hip flexion              SILT in BUE and BLE              Normal finger-to-nose              Gait  stable, though feels left leg dragging a little              Negative Romberg, negative pronator drift  Psych:              Normal affect, normal mood    Emergency Department Course   ECG  ECG obtained at 1306, ECG read at 1306  Normal sinus rhythm  Normal ECG   No significant change as compared to prior, dated 9/12/01.  Rate 88 bpm. MO interval 140 ms. QRS duration 90 ms. QT/QTc 382/462 ms. P-R-T axes 45 -16 36.     Imaging:  MR Brain w/o & w Contrast   Final Result   IMPRESSION:   1.  Normal Head MRI.   2.  No mass hemorrhage or stroke.      VENITA HAYNES MD            SYSTEM ID:  [id number]      CTA Head Neck with Contrast   Final Result   IMPRESSION:    CTA Head: No evidence of large vessel occlusion or high-grade   stenosis.    CTA Neck: No evidence of large vessel occlusion or high-grade   stenosis.        TRELL WHITT MD            SYSTEM ID:  [id number]      CT Head w/o Contrast   Final Result   IMPRESSION: No CT evidence of acute ischemia or hemorrhage.      Findings were discussed by phone between Dr. Whitt and Dr. Redman at   oh 1:00 PM on 3/2/2022.      TRELL WHITT MD            SYSTEM ID:  [id number]        Report per radiology    Laboratory:  Labs Ordered and Resulted from Time of ED Arrival to Time of ED Departure   BASIC METABOLIC PANEL - Abnormal       Result Value    Sodium 137      Potassium 4.0      Chloride 108      Carbon Dioxide (CO2) 24      Anion Gap 5      Urea Nitrogen 15      Creatinine 0.60      Calcium 9.5      Glucose 101 (*)     GFR Estimate >90     TROPONIN I - Normal    Troponin I High Sensitivity 4     GLUCOSE BY METER - Normal    GLUCOSE BY METER POCT 94     ISTAT HCG QUANTITATIVE PREGNANCY POCT - Normal    HCG QUANTITATIVE POCT <5.0     TSH WITH FREE T4 REFLEX - Normal    TSH 1.55     CBC WITH PLATELETS AND DIFFERENTIAL    WBC Count 7.4      RBC Count 4.89      Hemoglobin 14.5      Hematocrit 41.7      MCV 85      MCH 29.7      MCHC 34.8      RDW 13.2      Platelet Count 247       % Neutrophils 61      % Lymphocytes 29      % Monocytes 8      % Eosinophils 1      % Basophils 1      % Immature Granulocytes 0      NRBCs per 100 WBC 0      Absolute Neutrophils 4.5      Absolute Lymphocytes 2.2      Absolute Monocytes 0.6      Absolute Eosinophils 0.1      Absolute Basophils 0.0      Absolute Immature Granulocytes 0.0      Absolute NRBCs 0.0          Procedures  None    Emergency Department Course:  Reviewed:  I reviewed nursing notes, vitals, past medical history and Care Everywhere    Assessments:  1215 I obtained history and examined the patient as noted above.   1343 I rechecked the patient and explained findings.    Consults:  1226 I spoke with stroke neurology.  1301 I spoke with radiology.  1302 I spoke with stroke neurology.  1320 I spoke with stroke neurology.    Interventions:  1301 NS 1L IV Bolus     Disposition:  The patient was discharged to home.     Impression & Plan     The patient has stroke symptoms:         ED Stroke specific documentation           NIHSS PDF     Patient last known well time: 0900  ED Provider first to bedside at: 1215  CT Results received at: 1301    Thrombolytics:   Not given due to minor/isolated/quickly resolving symptoms.    If treating with thrombolytics: Ensure SBP<180 and DBP<105 prior to treatment with thrombolytics.  Administering thrombolytics after treatment with IV labetalol, hydralazine, or nicardipine is reasonable once BP control is established.    Endovascular Retrieval:  Not initiated due to absence of proximal vessel occlusion    National Institutes of Health Stroke Scale (Baseline)  Time Performed: 1220     Score    Level of consciousness: (0)   Alert, keenly responsive    LOC questions: (0)   Answers both questions correctly    LOC commands: (0)   Performs both tasks correctly    Best gaze: (0)   Normal    Visual: (0)   No visual loss    Facial palsy: (0)   Normal symmetrical movements    Motor arm (left): (0)   No drift    Motor arm  (right): (0)   No drift    Motor leg (left): (0)   No drift    Motor leg (right): (0)   No drift    Limb ataxia: (0)   Absent    Sensory: (0)   Normal- no sensory loss    Best language: (0)   Normal- no aphasia    Dysarthria: (0)   Normal    Extinction and inattention: (0)   No abnormality        Total Score:  0        Stroke Mimics were considered (including migraine headache, seizure disorder, hypoglycemia (or hyperglycemia), head or spinal trauma, CNS infection, Toxin ingestion and shock state (e.g. sepsis) .    Medical Decision Making:  Di Garcia is a 46-year-old female with a PMH significant for HELLP syndrome, and migraines, who presents to the ED for evaluation of hypertension.  VS on presentation reveal elevated BP though otherwise are unremarkable.  History, exam, and ED course as outlined above.  Patient presents with concerns regarding elevated blood pressure, though also reports right-sided headache, and left-sided extremity symptoms.  Tier 1 code stroke activated, with care expedited to obtain CT/CTA of the head and neck.  Fortunately, no evidence of ICH, large vessel occlusion, aneurysmal disease, or dissection is noted.  Patient reports symptoms have been improving since they first began.  Subsequent MRI negative for evidence of ischemia.  Clinical history and evaluation not supportive of ruptured subarachnoid hemorrhage.  CT imaging today obtained less than 6 hours since onset of headache, and CTA negative for aneurysmal disease.  Risks of LP felt to outweigh benefits.  Patient informed of the incidentally noted thyroid nodules and mild lymphadenopathy.  She will follow-up with her PCP regarding this, and understands this requires further work-up.  Case reviewed with stroke neurology, who performed a telestroke assessment.  Please refer to their associated documentation.  No indication for IV or IA lytic therapy, and their impression was that symptoms most likely represent complex migraine  given history of prior migraine headaches.  No other evidence of endorgan damage.  Patient denies any symptoms of chest pain or chest pressure.  EKG demonstrates sinus rhythm without findings of acute ischemia or acute changes compared with prior EKG available for comparison from 2021.  Troponin has returned normal.  Renal function baseline.  Patient was reevaluated multiple times during her ER course.  Her symptoms have markedly improved.  She is ambulatory with a steady stable gait.  Stroke neurology does not feel further ischemic work-up indicated at this time.  Patient does have a follow-up with a PCP on Friday (2 days).  We discussed options including initiation of antihypertensive medication from the ED versus following up with PCP.  She is electing for the latter which I feel to be reasonable given her improvements in blood pressure.  Close monitoring of symptoms discussed in clinic strict return precautions including severe headache, vomiting, new focal neurologic deficits, chest pain or any other concerns.  All questions have been answered prior to discharge.      Diagnosis:    ICD-10-CM    1. Elevated blood pressure reading without diagnosis of hypertension  R03.0    2. Left-sided weakness  R53.1    3. Thyroid nodule  E04.1      Scribe Disclosure:  MARIA LUISA, Mary Beth Elizondo, am serving as a scribe at 12:04 PM on 3/2/2022 to document services personally performed by Kishor Redman MD based on my observations and the provider's statements to me.     Kishor Redman MD  03/03/22 9014

## 2022-03-02 NOTE — DISCHARGE INSTRUCTIONS
Please follow-up with your primary care provider at your previously scheduled appointment on Friday.    Return to the ER if you develop any new or troubling symptoms such as severe headache, numbness, weakness, or any other concerns.    Discharge Instructions  Hypertension - High Blood Pressure    During you visit to the Emergency Department, your blood pressure was higher than the recommended blood pressure.  This may be related to stress, pain, medication or other temporary conditions. In these cases, your blood pressure may return to normal on its own. If you have a history of high blood pressure, you may need to have your provider adjust your medications. Sometimes, your high measurement here may indicate that you have developed high blood pressure that will stay high unless it is treated. As a general rule, high blood pressure causes problems over years rather than days, weeks, or months. So, while it is important to treat blood pressure, it is rarely important to treat blood pressure immediately. Occasionally we will begin a medication in the Emergency Department; more often we will recommend close follow-up for medications with a primary doctor/clinic.    Generally, every Emergency Department visit should have a follow-up clinic visit with either a primary or a specialty clinic/provider. Please follow-up as instructed by your emergency provider today.    Return to the Emergency Department if you start to have:  A severe headache.  Chest pain.  Shortness of breath.  Weakness or numbness that affects one part of the body.  Confusion.  Vision changes.  Significant swelling of legs and/or eyes.  A reaction to any medication started in the Emergency Department.    What can I do to help myself?  Avoid alcohol.  Take any blood pressure medicine that you are prescribed.  Get a good night s sleep.  Lower your salt intake.  Exercise.  Lose weight.  Manage stress.  See your doctor regularly    If blood pressure  medication was started in the Emergency Department:  The medicine may not have an immediate effect. The body and brain determine what blood pressure you have. The medicine s job is to retrain the body s  thermostat  to a lower blood pressure.  You will need to follow up with your provider to see how this medicine is working for you.  If you were given a prescription for medicine here today, be sure to read all of the information (including the package insert) that comes with your prescription.  This will include important information about the medicine, its side effects, and any warnings that you need to know about.  The pharmacist who fills the prescription can provide more information and answer questions you may have about the medicine.  If you have questions or concerns that the pharmacist cannot address, please call or return to the Emergency Department.   Remember that you can always come back to the Emergency Department if you are not able to see your regular provider in the amount of time listed above, if you get any new symptoms, or if there is anything that worries you.

## 2022-03-02 NOTE — ED TRIAGE NOTES
HTN. Hx of HELLP syndrome in the past. Feeling palpitations, headache, blood pressure higher than normal. Not currently on BP meds. Recently started trazodone. Took motrin recently that helped her headache. Mainly concerned about BP

## 2022-03-02 NOTE — CONSULTS
[facility]    Stroke Consult Note    Reason for Consult: Stroke Code     Chief Complaint: Hypertension      HPI  Di Garcia is a 46 year old female with PMH of HELLP syndrome, presented to [facility] ED on 3/2/2022 with R sided headache and uncoordinated L hand and L leg. LKW 0900 am. Patient noticed aroun 0930 am she was typing on the computer and she kept typing incorrectly with her L hand. She also noticed she was tripping over items in her house a lot more than usual. She also expressed blurry vision in only her R eye. Patient stated that she has been having headaches on and off for the last two weeks. ED /107.    Tier 1 stroke code called. NIHSS = 0, patient stated her symptoms were improving and she was able to walk on camera with no issues.     Imaging Findings  CT head: No CT evidence of acute ischemia or hemorrhage  CTA head: No LVO or significant stenosis   CTA neck: No occlusion, dissection or significant stenosis     Thrombolytic Treatment   Not given due to minor/isolated/quickly resolving symptoms and unclear or unfavorable risk-benefit profile for extended window thrombolysis beyond the conventional 4.5 hour time window.    Endovascular Treatment  Not initiated due to absence of proximal vessel occlusion    Impression   46 year old female with PMH of HELLP syndrome presents with R sided headache and L hand and L leg incoordination. CT head negative for acute infarct or bleed. CTA head/neck negative for occlusion or dissection. Patient expressed she has been having migraines for the last two weeks. Symptoms likely related to migraine and elevated BP. Recommend obtaining MRI to rule out stroke or other etiology.     Recommendations  - MRI brain with and without contrast, if MRI negative for acute stroke or bleed, we will sign off.       AMI Bernardo, CNP  Vascular Neurology  To page me or covering stroke neurology team member, click here: AMCOM   Choose  "\"On Call\" tab at top, then search dropdown box for \"Neurology Adult\", select location, press Enter, then look for stroke/neuro ICU/telestroke.    ______________________________________________________    Clinically Significant Risk Factors Present on Admission                    Past Medical History   Past Medical History:   Diagnosis Date     HELLP syndrome      Past Surgical History   Past Surgical History:   Procedure Laterality Date     [place] teeth removal       Medications   Home Meds  Prior to Admission medications    Medication Sig Start Date End Date Taking? Authorizing Provider   EPINEPHrine (EPIPEN/ADRENACLICK/OR ANY BX GENERIC EQUIV) 0.3 MG/0.3ML injection 2-pack Inject 0.3 mLs (0.3 mg) into the muscle once as needed  Patient not taking: Reported on 9/23/2020 6/11/19   Di Rasheed PA-C   levonorgestrel (MIRENA) 20 MCG/24HR IUD 1 each by Intrauterine route once    Reported, Patient       Scheduled Meds      Infusion Meds      PRN Meds      Allergies   Allergies   Allergen Reactions     Bactrim [Sulfamethoxazole W/Trimethoprim]      Cat Hair Extract Itching     Nalbuphine      Other reaction(s): Vomiting     Adhesive Tape Rash     Family History   No family history on file.  Social History   Social History     Tobacco Use     Smoking status: Never Smoker     Smokeless tobacco: Never Used   Substance Use Topics     Alcohol use: Not Currently     Drug use: No       Review of Systems   The 10 point Review of Systems is negative other than noted in the HPI or here.        PHYSICAL EXAMINATION  Temp:  [97.5  F (36.4  C)] 97.5  F (36.4  C)  Pulse:  [87] 87  Resp:  [20] 20  BP: (174)/(107) 174/107  SpO2:  [100 %] 100 %     General Exam  General:  patient lying in bed without any acute distress    HEENT:  normocephalic/atraumatic  Pulmonary:  no respiratory distress    Neuro Exam  Mental Status:  alert, oriented x 3, follows commands, speech clear and fluent, naming and repetition normal  Cranial Nerves: "  visual fields intact (tested by nurse), EOMI with normal smooth pursuit, facial sensation intact and symmetric (tested by nurse), facial movements symmetric, hearing not formally tested but intact to conversation, no dysarthria, shoulder shrug equal bilaterally, tongue protrusion midline  Motor:  no abnormal movements, able to move all limbs antigravity spontaneously with no signs of hemiparesis observed, no pronator drift   Reflexes:  unable to test (telestroke)  Sensory:  light touch sensation intact and symmetric throughout upper and lower extremities (assessed by nurse), no extinction on double simultaneous stimulation (assessed by nurse)  Coordination:  normal finger-to-nose and heel-to-shin bilaterally without dysmetria, rapid alternating movements symmetric  Station/Gait:  unable to test due to telestroke    Dysphagia Screen  Per Nursing    Stroke Scales    NIHSS  Interval     Interval Comments stroke neuro tele exam 3/2/2022 (03/02/22 1321)   1a. Level of Consciousness 0-->Alert, keenly responsive   1b. LOC Questions 0-->Answers both questions correctly   1c. LOC Commands 0-->Performs both tasks correctly   2.   Best Gaze 0-->Normal   3.   Visual 0-->No visual loss   4.   Facial Palsy 0-->Normal symmetrical movements   5a. Motor Arm, Left 0-->No drift, limb holds 90 (or 45) degrees for full 10 secs   5b. Motor Arm, Right 0-->No drift, limb holds 90 (or 45) degrees for full 10 secs   6a. Motor Leg, Left 0-->No drift, leg holds 30 degree position for full 5 secs   6b. Motor Leg, right 0-->No drift, leg holds 30 degree position for full 5 secs   7.   Limb Ataxia 0-->Absent   8.   Sensory 0-->Normal, no sensory loss   9.   Best Language 0-->No aphasia, normal   10. Dysarthria 0-->Normal   11. Extinction and Inattention  0-->No abnormality   Total 0 (03/02/22 1321)       Modified Vianca Score (Pre-morbid)  0-No deficits    Imaging  I personally reviewed all imaging; relevant findings per HPI.     Lab Results  Data   CBC  Recent Labs   Lab 03/02/22  1237   WBC 7.4   RBC 4.89   HGB 14.5   HCT 41.7        Basic Metabolic Panel    Recent Labs   Lab 03/02/22  1237 03/02/22  1227     --    POTASSIUM 4.0  --    CHLORIDE 108  --    CO2 24  --    BUN 15  --    CR 0.60  --    * 94   ARNAV 9.5  --      Liver Panel  No results for input(s): PROTTOTAL, ALBUMIN, BILITOTAL, ALKPHOS, AST, ALT, BILIDIRECT in the last 168 hours.  INR    Recent Labs   Lab Test 07/16/15  1929   INR 1.05      Lipid Profile  No lab results found.  A1C  No lab results found.  Troponin I  No results for input(s): TROPONIN, GHTROP in the last 168 hours.       Stroke Code Data Data   Stroke Code Data  (for stroke code with tele)  Stroke code activated 03/02/22   1224   First stroke provider response 03/02/22   1226   Video start time 03/02/22   1303   Video end time 03/02/22   1317   Last known normal 03/02/22   0900   Time of discovery  (or onset of symptoms)  03/02/22   0930   Head CT read by Stroke Neuro Dr/Provider 03/02/22   1301 (CTA images delay pushing to PACS)   Was stroke code de-escalated? Yes 03/02/22 1320  patient is outside emergent treatment time parameters and quickly resolving symptoms      Telestroke Service Details  Type of service telemedicine diagnostic assessment of acute neurological changes   Reason telemedicine is appropriate patient requires assessment with a specialist for diagnosis and treatment of neurological symptoms   Mode of transmission secure interactive audio and video communication per [employer]   Originating site (patient location) [facility]    Distant site (provider location) [facility]

## 2022-03-07 ENCOUNTER — OFFICE VISIT (OUTPATIENT)
Dept: URGENT CARE | Facility: URGENT CARE | Age: 47
End: 2022-03-07
Payer: COMMERCIAL

## 2022-03-07 VITALS
TEMPERATURE: 97.6 F | OXYGEN SATURATION: 98 % | RESPIRATION RATE: 16 BRPM | HEART RATE: 74 BPM | SYSTOLIC BLOOD PRESSURE: 140 MMHG | DIASTOLIC BLOOD PRESSURE: 92 MMHG

## 2022-03-07 DIAGNOSIS — R07.0 THROAT PAIN: Primary | ICD-10-CM

## 2022-03-07 LAB — DEPRECATED S PYO AG THROAT QL EIA: NEGATIVE

## 2022-03-07 PROCEDURE — 99213 OFFICE O/P EST LOW 20 MIN: CPT | Performed by: PHYSICIAN ASSISTANT

## 2022-03-07 PROCEDURE — 87651 STREP A DNA AMP PROBE: CPT | Performed by: PHYSICIAN ASSISTANT

## 2022-03-07 RX ORDER — ESCITALOPRAM OXALATE 10 MG/1
TABLET ORAL
COMMUNITY
Start: 2022-03-06

## 2022-03-07 RX ORDER — HYDROXYZINE PAMOATE 25 MG/1
CAPSULE ORAL
COMMUNITY
Start: 2022-03-06

## 2022-03-07 RX ORDER — LISINOPRIL 10 MG/1
TABLET ORAL
COMMUNITY
Start: 2022-03-06

## 2022-03-08 LAB — GROUP A STREP BY PCR: NOT DETECTED

## 2022-03-08 NOTE — PROGRESS NOTES
SUBJECTIVE:   Di Garcia is a 46 year old female presenting with a chief complaint of left ear pain and ST and hurts and feels like on fire  Had swollen lymph nodes and had CT of head and neck.  No conclusive findings  Known Thyroid nodules.  Teeth feel fine.  No fevers.    Onset of symptoms was 2 day(s) ago.  Course of illness is same.    Severity moderate  Current and Associated symptoms: negative other than stated above   Treatment measures tried include Tylenol/Ibuprofen, Fluids and Rest.  Predisposing factors include None.    Past Medical History:   Diagnosis Date     HELLP syndrome      Current Outpatient Medications   Medication Sig Dispense Refill     EPINEPHrine (EPIPEN/ADRENACLICK/OR ANY BX GENERIC EQUIV) 0.3 MG/0.3ML injection 2-pack Inject 0.3 mLs (0.3 mg) into the muscle once as needed (Patient not taking: Reported on 9/23/2020) 1 each 1     escitalopram (LEXAPRO) 10 MG tablet Take 1/2 a Tablet (5 mg) by mouth every morning.       hydrOXYzine (VISTARIL) 25 MG capsule Take 1 Capsule (25 mg) by mouth 3 times daily if needed for Anxiety.       levonorgestrel (MIRENA) 20 MCG/24HR IUD 1 each by Intrauterine route once (Patient not taking: Reported on 3/7/2022)       lisinopril (ZESTRIL) 10 MG tablet Take 1 Tablet (10 mg) by mouth once daily.       Social History     Tobacco Use     Smoking status: Never Smoker     Smokeless tobacco: Never Used   Substance Use Topics     Alcohol use: Not Currently       ROS:  Review of systems negative except as stated above.    OBJECTIVE:  BP (!) 140/92 (BP Location: Right arm, Patient Position: Sitting, Cuff Size: Adult Regular)   Pulse 74   Temp 97.6  F (36.4  C) (Tympanic)   Resp 16   SpO2 98%   GENERAL APPEARANCE: healthy, alert and no distress  EYES: EOMI,  PERRL, conjunctiva clear  HENT: ear canals and TM's normal.  Nose and mouth without ulcers, erythema or lesions  NECK: supple, nontender, no lymphadenopathy  RESP: lungs clear to auscultation - no rales,  rhonchi or wheezes  CV: regular rates and rhythm, normal S1 S2, no murmur noted  SKIN: no suspicious lesions or rashes    Results for orders placed or performed in visit on 03/07/22   Streptococcus A Rapid Screen w/Reflex to PCR - Clinic Collect     Status: Normal    Specimen: Throat; Swab   Result Value Ref Range    Group A Strep antigen Negative Negative       ASSESSMENT:  Viral pharyngitis    PLAN:  Tylenol, Ibuprofen, Fluids, Rest and OTC med for sx relief  No signs of infection at this time.  Follow-up with PCP as needed   See orders in Epic

## 2022-04-10 ENCOUNTER — HEALTH MAINTENANCE LETTER (OUTPATIENT)
Age: 47
End: 2022-04-10

## 2022-08-08 ENCOUNTER — OFFICE VISIT (OUTPATIENT)
Dept: URGENT CARE | Facility: URGENT CARE | Age: 47
End: 2022-08-08
Payer: COMMERCIAL

## 2022-08-08 VITALS
TEMPERATURE: 98.2 F | WEIGHT: 185 LBS | HEART RATE: 76 BPM | SYSTOLIC BLOOD PRESSURE: 126 MMHG | DIASTOLIC BLOOD PRESSURE: 88 MMHG | BODY MASS INDEX: 31.76 KG/M2 | OXYGEN SATURATION: 100 %

## 2022-08-08 DIAGNOSIS — R30.0 DYSURIA: ICD-10-CM

## 2022-08-08 DIAGNOSIS — N30.01 ACUTE CYSTITIS WITH HEMATURIA: Primary | ICD-10-CM

## 2022-08-08 LAB
ALBUMIN UR-MCNC: NEGATIVE MG/DL
APPEARANCE UR: ABNORMAL
BACTERIA #/AREA URNS HPF: ABNORMAL /HPF
BILIRUB UR QL STRIP: NEGATIVE
CLUE CELLS: ABNORMAL
COLOR UR AUTO: YELLOW
GLUCOSE UR STRIP-MCNC: NEGATIVE MG/DL
HGB UR QL STRIP: ABNORMAL
KETONES UR STRIP-MCNC: NEGATIVE MG/DL
LEUKOCYTE ESTERASE UR QL STRIP: ABNORMAL
NITRATE UR QL: NEGATIVE
PH UR STRIP: 7 [PH] (ref 5–7)
RBC #/AREA URNS AUTO: ABNORMAL /HPF
SP GR UR STRIP: 1.01 (ref 1–1.03)
SQUAMOUS #/AREA URNS AUTO: ABNORMAL /LPF
TRICHOMONAS, WET PREP: ABNORMAL
UROBILINOGEN UR STRIP-ACNC: 0.2 E.U./DL
WBC #/AREA URNS AUTO: ABNORMAL /HPF
WBC'S/HIGH POWER FIELD, WET PREP: ABNORMAL
YEAST, WET PREP: ABNORMAL

## 2022-08-08 PROCEDURE — 87210 SMEAR WET MOUNT SALINE/INK: CPT

## 2022-08-08 PROCEDURE — 87086 URINE CULTURE/COLONY COUNT: CPT | Performed by: FAMILY MEDICINE

## 2022-08-08 PROCEDURE — 81001 URINALYSIS AUTO W/SCOPE: CPT

## 2022-08-08 PROCEDURE — 99213 OFFICE O/P EST LOW 20 MIN: CPT | Performed by: FAMILY MEDICINE

## 2022-08-08 RX ORDER — NITROFURANTOIN 25; 75 MG/1; MG/1
100 CAPSULE ORAL 2 TIMES DAILY
Qty: 10 CAPSULE | Refills: 0 | Status: SHIPPED | OUTPATIENT
Start: 2022-08-08 | End: 2022-08-13

## 2022-08-08 NOTE — PATIENT INSTRUCTIONS
Take medication Macrobid/nitrofurantoin twice a day for 5 days    Symptoms should improve within the next 2-3 days. If no improvement, call clinic to discuss or return for re-evaluation    Drink approximately 80 ounces of water a day to stay hydrated (more if sweating or spending time outdoors)     If you develop flank pain, fevers, nausea/vomiting call immediately for assistance

## 2022-08-08 NOTE — PROGRESS NOTES
Assessment & Plan     Dysuria  - UA macro with reflex to Microscopic and Culture - Clinc Collect  - Wet prep - Clinic Collect  - Urine Microscopic Exam  - Urine Culture    Acute cystitis with hematuria  - nitroFURantoin macrocrystal-monohydrate (MACROBID) 100 MG capsule  Dispense: 10 capsule; Refill: 0  No e/o pyelonephritis start macrobid now. Good oral hydration encouraged. Culture pending      Zachary Frias MD   [facility]    Jess Sevilla is a 46 year old female who presents to clinic today for the following health issues:  Chief Complaint   Patient presents with     Urgent Care     UTI     Start 8/5/22 sx increased frequency and urgency  dysuria hx UTI  tx  Azo cranberry,      HPI    Patient reports her last bladder infection was 2 months ago.  She has no back pain, fever or vomiting.  May be early nausea but nothing significant.  She has been try to stay hydrated.  She recalls a couple weekends ago she was outdoors at a tournament and got dehydrated slowly throughout the week and then was obviously about 4 days ago she developed increased urinary frequency.  She has used the medications as listed above.  No history reported of kidney infections or kidney stones.      There are no problems to display for this patient.      Current Outpatient Medications   Medication     nitroFURantoin macrocrystal-monohydrate (MACROBID) 100 MG capsule     EPINEPHrine (EPIPEN/ADRENACLICK/OR ANY BX GENERIC EQUIV) 0.3 MG/0.3ML injection 2-pack     escitalopram (LEXAPRO) 10 MG tablet     hydrOXYzine (VISTARIL) 25 MG capsule     levonorgestrel (MIRENA) 20 MCG/24HR IUD     lisinopril (ZESTRIL) 10 MG tablet     No current facility-administered medications for this visit.           Objective    /88   Pulse 76   Temp 98.2  F (36.8  C)   Wt 83.9 kg (185 lb)   SpO2 100%   BMI 31.76 kg/m    Physical Exam     GEN: NAD, appears age, good historian    Results for orders placed or performed in visit on  08/08/22   UA macro with reflex to Microscopic and Culture - Clinc Collect     Status: Abnormal    Specimen: Urine, Clean Catch   Result Value Ref Range    Color Urine Yellow Colorless, Straw, Light Yellow, Yellow    Appearance Urine Slightly Cloudy (A) Clear    Glucose Urine Negative Negative mg/dL    Bilirubin Urine Negative Negative    Ketones Urine Negative Negative mg/dL    Specific Gravity Urine 1.015 1.003 - 1.035    Blood Urine Trace (A) Negative    pH Urine 7.0 5.0 - 7.0    Protein Albumin Urine Negative Negative mg/dL    Urobilinogen Urine 0.2 0.2, 1.0 E.U./dL    Nitrite Urine Negative Negative    Leukocyte Esterase Urine Moderate (A) Negative   Urine Microscopic Exam     Status: Abnormal   Result Value Ref Range    Bacteria Urine Moderate (A) None Seen /HPF    RBC Urine 2-5 (A) 0-2 /HPF /HPF    WBC Urine 10-25 (A) 0-5 /HPF /HPF    Squamous Epithelials Urine Few (A) None Seen /LPF   Wet prep - Clinic Collect     Status: Abnormal    Specimen: Vagina; Swab   Result Value Ref Range    Trichomonas Absent Absent    Yeast Absent Absent    Clue Cells Absent Absent    WBCs/high power field 1+ (A) None                     The use of Dragon/nvite dictation services may have been used to construct the content in this note; any grammatical or spelling errors are non-intentional. Please contact the author of this note directly if you are in need of any clarification.

## 2022-08-09 LAB — BACTERIA UR CULT: NORMAL

## 2022-08-10 ENCOUNTER — HOSPITAL ENCOUNTER (EMERGENCY)
Facility: CLINIC | Age: 47
Discharge: HOME | End: 2022-08-10
Attending: PHYSICIAN ASSISTANT | Admitting: PHYSICIAN ASSISTANT
Payer: COMMERCIAL

## 2022-08-10 ENCOUNTER — APPOINTMENT (OUTPATIENT)
Dept: GENERAL RADIOLOGY | Facility: CLINIC | Age: 47
End: 2022-08-10
Attending: PHYSICIAN ASSISTANT
Payer: COMMERCIAL

## 2022-08-10 ENCOUNTER — APPOINTMENT (OUTPATIENT)
Dept: CT IMAGING | Facility: CLINIC | Age: 47
End: 2022-08-10
Attending: PHYSICIAN ASSISTANT
Payer: COMMERCIAL

## 2022-08-10 VITALS
BODY MASS INDEX: 32.01 KG/M2 | DIASTOLIC BLOOD PRESSURE: 89 MMHG | HEART RATE: 82 BPM | TEMPERATURE: 97.8 F | SYSTOLIC BLOOD PRESSURE: 139 MMHG | RESPIRATION RATE: 18 BRPM | WEIGHT: 186.51 LBS | OXYGEN SATURATION: 99 %

## 2022-08-10 DIAGNOSIS — H53.9 VISION CHANGES: ICD-10-CM

## 2022-08-10 DIAGNOSIS — E04.1 THYROID NODULE: ICD-10-CM

## 2022-08-10 DIAGNOSIS — R03.0 ELEVATED BLOOD PRESSURE READING: ICD-10-CM

## 2022-08-10 DIAGNOSIS — R42 DIZZINESS: ICD-10-CM

## 2022-08-10 LAB
ALBUMIN UR-MCNC: NEGATIVE MG/DL
ANION GAP SERPL CALCULATED.3IONS-SCNC: 8 MMOL/L (ref 7–15)
APPEARANCE UR: CLEAR
BACTERIA #/AREA URNS HPF: ABNORMAL /HPF
BASOPHILS # BLD AUTO: 0.1 10E3/UL (ref 0–0.2)
BASOPHILS NFR BLD AUTO: 1 %
BILIRUB UR QL STRIP: NEGATIVE
BUN SERPL-MCNC: 15.9 MG/DL (ref 6–20)
CALCIUM SERPL-MCNC: 9.1 MG/DL (ref 8.6–10)
CHLORIDE SERPL-SCNC: 106 MMOL/L (ref 98–107)
COLOR UR AUTO: ABNORMAL
CREAT SERPL-MCNC: 0.64 MG/DL (ref 0.51–0.95)
DEPRECATED HCO3 PLAS-SCNC: 26 MMOL/L (ref 22–29)
EOSINOPHIL # BLD AUTO: 0.1 10E3/UL (ref 0–0.7)
EOSINOPHIL NFR BLD AUTO: 1 %
ERYTHROCYTE [DISTWIDTH] IN BLOOD BY AUTOMATED COUNT: 12.6 % (ref 10–15)
GFR SERPL CREATININE-BSD FRML MDRD: >90 ML/MIN/1.73M2
GLUCOSE SERPL-MCNC: 104 MG/DL (ref 70–99)
GLUCOSE UR STRIP-MCNC: NEGATIVE MG/DL
HCT VFR BLD AUTO: 39.2 % (ref 35–47)
HGB BLD-MCNC: 13.2 G/DL (ref 11.7–15.7)
HGB UR QL STRIP: NEGATIVE
HOLD SPECIMEN: NORMAL
IMM GRANULOCYTES # BLD: 0 10E3/UL
IMM GRANULOCYTES NFR BLD: 1 %
KETONES UR STRIP-MCNC: NEGATIVE MG/DL
LEUKOCYTE ESTERASE UR QL STRIP: NEGATIVE
LYMPHOCYTES # BLD AUTO: 2 10E3/UL (ref 0.8–5.3)
LYMPHOCYTES NFR BLD AUTO: 31 %
MCH RBC QN AUTO: 30.1 PG (ref 26.5–33)
MCHC RBC AUTO-ENTMCNC: 33.7 G/DL (ref 31.5–36.5)
MCV RBC AUTO: 90 FL (ref 78–100)
MONOCYTES # BLD AUTO: 0.5 10E3/UL (ref 0–1.3)
MONOCYTES NFR BLD AUTO: 7 %
NEUTROPHILS # BLD AUTO: 3.9 10E3/UL (ref 1.6–8.3)
NEUTROPHILS NFR BLD AUTO: 59 %
NITRATE UR QL: NEGATIVE
NRBC # BLD AUTO: 0 10E3/UL
NRBC BLD AUTO-RTO: 0 /100
PH UR STRIP: 6.5 [PH] (ref 5–7)
PLATELET # BLD AUTO: 216 10E3/UL (ref 150–450)
POTASSIUM SERPL-SCNC: 4 MMOL/L (ref 3.4–5.3)
RBC # BLD AUTO: 4.38 10E6/UL (ref 3.8–5.2)
RBC URINE: 1 /HPF
SODIUM SERPL-SCNC: 140 MMOL/L (ref 136–145)
SP GR UR STRIP: 1.01 (ref 1–1.03)
TROPONIN T SERPL HS-MCNC: 7 NG/L
UROBILINOGEN UR STRIP-MCNC: NORMAL MG/DL
WBC # BLD AUTO: 6.5 10E3/UL (ref 4–11)
WBC URINE: 1 /HPF

## 2022-08-10 PROCEDURE — 250N000009 HC RX 250: Performed by: PHYSICIAN ASSISTANT

## 2022-08-10 PROCEDURE — 70450 CT HEAD/BRAIN W/O DYE: CPT

## 2022-08-10 PROCEDURE — 250N000011 HC RX IP 250 OP 636: Performed by: PHYSICIAN ASSISTANT

## 2022-08-10 PROCEDURE — 81001 URINALYSIS AUTO W/SCOPE: CPT | Performed by: PHYSICIAN ASSISTANT

## 2022-08-10 PROCEDURE — 36415 COLL VENOUS BLD VENIPUNCTURE: CPT | Performed by: EMERGENCY MEDICINE

## 2022-08-10 PROCEDURE — 80048 BASIC METABOLIC PNL TOTAL CA: CPT | Performed by: EMERGENCY MEDICINE

## 2022-08-10 PROCEDURE — 85025 COMPLETE CBC W/AUTO DIFF WBC: CPT | Performed by: EMERGENCY MEDICINE

## 2022-08-10 PROCEDURE — 70498 CT ANGIOGRAPHY NECK: CPT

## 2022-08-10 PROCEDURE — 84484 ASSAY OF TROPONIN QUANT: CPT | Performed by: PHYSICIAN ASSISTANT

## 2022-08-10 PROCEDURE — 93005 ELECTROCARDIOGRAM TRACING: CPT

## 2022-08-10 PROCEDURE — 99285 EMERGENCY DEPT VISIT HI MDM: CPT | Mod: 25

## 2022-08-10 PROCEDURE — 81001 URINALYSIS AUTO W/SCOPE: CPT | Performed by: EMERGENCY MEDICINE

## 2022-08-10 PROCEDURE — 70496 CT ANGIOGRAPHY HEAD: CPT

## 2022-08-10 PROCEDURE — 71046 X-RAY EXAM CHEST 2 VIEWS: CPT

## 2022-08-10 RX ORDER — IOPAMIDOL 755 MG/ML
500 INJECTION, SOLUTION INTRAVASCULAR ONCE
Status: COMPLETED | OUTPATIENT
Start: 2022-08-10 | End: 2022-08-10

## 2022-08-10 RX ADMIN — SODIUM CHLORIDE 80 ML: 9 INJECTION, SOLUTION INTRAVENOUS at 17:17

## 2022-08-10 RX ADMIN — IOPAMIDOL 68 ML: 755 INJECTION, SOLUTION INTRAVENOUS at 17:16

## 2022-08-10 ASSESSMENT — ENCOUNTER SYMPTOMS
SHORTNESS OF BREATH: 0
ABDOMINAL PAIN: 0
NUMBNESS: 1
NECK PAIN: 1
HEADACHES: 1
COUGH: 0
LIGHT-HEADEDNESS: 1
BACK PAIN: 0

## 2022-08-10 ASSESSMENT — ACTIVITIES OF DAILY LIVING (ADL): ADLS_ACUITY_SCORE: 35

## 2022-08-10 NOTE — ED TRIAGE NOTES
All symptoms on and off for past 10 days. Tingling in face and fingers. Dizziness when standing and frontal HA. Vision changes. Thoracic pain.

## 2022-08-10 NOTE — ED PROVIDER NOTES
"  History     Chief Complaint:  Multiple Complaints        The history is provided by the patient.      Di Garcia is a 46 year old female with a history of migraines and vertigo who presents with multiple complaints. She first noticed symptoms 10 days ago when she had numbness and tingling in her bilateral fingers tips and toes. This has been intermittent since then. She was seen by a chiropractor last week for this, but her symptoms did not improve. Then 4 days ago, she developed visual disturbance that was described as \"her 2 eyes weren't working together\". Two nights ago, she had a posterior headache. She continues to have a headache since then, and it has been moving to different positions in her head. Other symptoms mentioned at bedside include lightheadedness and left sided neck pain. Despite having a history of migraines and vertigo, she is not familiar with her symptoms. She has not taken any medications for pain management. She denies chest pain, shortness of breath, cough, back pain, or abdominal pain.     ROS:  Review of Systems   Eyes: Positive for visual disturbance.   Respiratory: Negative for cough and shortness of breath.    Cardiovascular: Negative for chest pain.   Gastrointestinal: Negative for abdominal pain.   Musculoskeletal: Positive for neck pain. Negative for back pain.   Neurological: Positive for light-headedness, numbness (w/ tingling) and headaches.      All other systems reviewed and are negative.    Allergies:  Bactrim [Sulfamethoxazole W/Trimethoprim]  Cat Hair Extract  Nalbuphine  Adhesive Tape     Medications:    Epinephrine    Escitalopram   Vistaril   Mirena   Zestril   Macrobid     Past Medical History:    HELLP syndrome   Gall bladder polyp   Anxiety   Fibroid, intramural   Menorrhagia   Endometrial mass   Bilateral ovarian cysts   Severe preeclampsia  Depression     Past Surgical History:    [place] teeth extraction    Dilation and curettage   Ovarian cyst removal w/ R " salpingectomy     Family History:    The patient denies pertinent family history.      Social History:  The patient presents to the ED alone via private vehicle   The patient reports that she has never smoked. She has never used smokeless tobacco. She reports previous alcohol use. She reports that she does not use drugs.  PCP: Russel Bearden     Physical Exam     Patient Vitals for the past 24 hrs:   BP Temp Temp src Pulse Resp SpO2 Weight   08/10/22 1816 139/89 -- -- 82 18 99 % --   08/10/22 1419 -- -- -- -- -- -- 84.6 kg (186 lb 8.2 oz)   08/10/22 1417 (!) 155/108 97.8  F (36.6  C) Temporal 80 18 99 % --        Physical Exam  General: Well appearing, pleasant male, resting on exam bed  HEENT: No evidence of trauma.  Conjunctive are clear.  Extraocular eye movements intact.  Neck range of motion intact.  Nose and throat clear.  Respiratory: Good breath sounds bilaterally  Cardiovascular: Normal rate and rhythm   Gastrointestinal: Soft, nontender.   Musculoskeletal: Atraumatic. No spinal tenderness.   Skin: Exposed skin clear.  Neurologic: Alert and oriented x4.  Gross sensation intact.  Proximal and distal motor strength of the upper and lower extremities intact.  Cerebellar function test intact.  Cranial nerves II through XII intact.  She is alert, answers questions, follows commands, has normal gaze and visual fields without any facial palsy.  Her upper and lower extremities have no drift.  She has no limb ataxia, changes in sensation or language, no dysarthria or neglect. Gait intact.  Psych:  Patient is cooperative, with normal affect.    Emergency Department Course   ECG:  ECG taken at 1429, ECG read at 1435  Sinus rhythm with premature atrial complexes   Otherwise normal ECG   Rate 83 bpm. PA interval 136 ms. QRS duration 88 ms. QT/QTc 398/467 ms. P-R-T axes 59 1 35.     Imaging:  CTA Head Neck with Contrast  Final Result  IMPRESSION:   HEAD CT:  1.  No acute intracranial process.  HEAD CTA:   1.  Normal CTA  [place].  NECK CTA:  1.  Normal neck CTA.    Head CT w/o contrast  Final Result  IMPRESSION:   HEAD CT:  1.  No acute intracranial process.  HEAD CTA:   1.  Normal CTA [place].  NECK CTA:  1.  Normal neck CTA.    Chest XR,  PA & LAT  Final Result  IMPRESSION: Negative chest.    Report per radiology     Laboratory:  Labs Ordered and Resulted from Time of ED Arrival to Time of ED Departure   BASIC METABOLIC PANEL - Abnormal       Result Value    Creatinine 0.64      Sodium 140      Potassium 4.0      Urea Nitrogen 15.9      Chloride 106      Carbon Dioxide (CO2) 26      Anion Gap 8      Glucose 104 (*)     GFR Estimate >90      Calcium 9.1     ROUTINE UA WITH MICROSCOPIC REFLEX TO CULTURE - Abnormal    Color Urine Straw      Appearance Urine Clear      Glucose Urine Negative      Bilirubin Urine Negative      Ketones Urine Negative      Specific Gravity Urine 1.006      Blood Urine Negative      pH Urine 6.5      Protein Albumin Urine Negative      Urobilinogen Urine Normal      Nitrite Urine Negative      Leukocyte Esterase Urine Negative      Bacteria Urine Few (*)     RBC Urine 1      WBC Urine 1     TROPONIN T, HIGH SENSITIVITY - Normal    Troponin T, High Sensitivity 7     CBC WITH PLATELETS AND DIFFERENTIAL    WBC Count 6.5      RBC Count 4.38      Hemoglobin 13.2      Hematocrit 39.2      MCV 90      MCH 30.1      MCHC 33.7      RDW 12.6      Platelet Count 216      % Neutrophils 59      % Lymphocytes 31      % Monocytes 7      % Eosinophils 1      % Basophils 1      % Immature Granulocytes 1      NRBCs per 100 WBC 0      Absolute Neutrophils 3.9      Absolute Lymphocytes 2.0      Absolute Monocytes 0.5      Absolute Eosinophils 0.1      Absolute Basophils 0.1      Absolute Immature Granulocytes 0.0      Absolute NRBCs 0.0        Disposition:  The patient was discharged to home.     Impression & Plan      Medical Decision Making:  Di Garcia is a 46 year old female with a hx of  migraines, presents emergency room today for evaluation of several symptoms including vision changes, back aching, and headache for the past 10 days.  See HPI.  Her vitals are unremarkable.  She has a nonfocal neurological exam.  Her EKG is unremarkable.  CBC, BMP, urine unremarkable.  She is currently being treated for UTI from a couple days ago.  Chest radiograph without any obvious widening of her mediastinum or pneumothorax.  CT of her head with head and neck angiography is unremarkable.  She remains clinically and vitally stable for over 4 hours.  Its unclear what her symptoms are secondary to at this time however I feel she is safe to go home.  I doubt any TIA or CVA at this time.  I doubt dissection, epidural abscess, pulmonary embolism, or other.  Incidental thyroid nodule noted and communicated the patient which she is following.  She is to return with new or worsening symptoms, see ophthalmology and neurology for further evaluation.  She has no further questions and agrees with plan.    Diagnosis:    ICD-10-CM    1. Dizziness  R42    2. Vision changes  H53.9    3. Elevated blood pressure reading  R03.0    4. Thyroid nodule  E04.1         Scribe Disclosure:  I, Mike Ngo, an serving as a scribe at 1510 on 8/10/2022 to document services personally performed by Flavio Falk PA-C  base on my observations and the provider's statements to me.    8/10/2022   Flavio Falk PA-C Cyr, Matthew R, PA-C  08/10/22 [id number]

## 2022-08-11 LAB
ATRIAL RATE - MUSE: 83 BPM
DIASTOLIC BLOOD PRESSURE - MUSE: NORMAL MMHG
INTERPRETATION ECG - MUSE: NORMAL
P AXIS - MUSE: 59 DEGREES
PR INTERVAL - MUSE: 136 MS
QRS DURATION - MUSE: 88 MS
QT - MUSE: 398 MS
QTC - MUSE: 467 MS
R AXIS - MUSE: 1 DEGREES
SYSTOLIC BLOOD PRESSURE - MUSE: NORMAL MMHG
T AXIS - MUSE: 35 DEGREES
VENTRICULAR RATE- MUSE: 83 BPM

## 2022-10-15 ENCOUNTER — HEALTH MAINTENANCE LETTER (OUTPATIENT)
Age: 47
End: 2022-10-15

## 2023-05-27 ENCOUNTER — HEALTH MAINTENANCE LETTER (OUTPATIENT)
Age: 48
End: 2023-05-27

## 2023-10-15 ENCOUNTER — HOSPITAL ENCOUNTER (EMERGENCY)
Facility: CLINIC | Age: 48
Discharge: HOME | End: 2023-10-15
Attending: EMERGENCY MEDICINE | Admitting: EMERGENCY MEDICINE
Payer: COMMERCIAL

## 2023-10-15 ENCOUNTER — APPOINTMENT (OUTPATIENT)
Dept: GENERAL RADIOLOGY | Facility: CLINIC | Age: 48
End: 2023-10-15
Attending: EMERGENCY MEDICINE
Payer: COMMERCIAL

## 2023-10-15 VITALS
SYSTOLIC BLOOD PRESSURE: 135 MMHG | DIASTOLIC BLOOD PRESSURE: 82 MMHG | TEMPERATURE: 97.5 F | HEART RATE: 80 BPM | BODY MASS INDEX: 31.71 KG/M2 | OXYGEN SATURATION: 97 % | WEIGHT: 184.75 LBS | RESPIRATION RATE: 18 BRPM

## 2023-10-15 DIAGNOSIS — R07.9 CHEST PAIN, UNSPECIFIED TYPE: ICD-10-CM

## 2023-10-15 LAB
ANION GAP SERPL CALCULATED.3IONS-SCNC: 9 MMOL/L (ref 7–15)
BASO+EOS+MONOS # BLD AUTO: NORMAL 10*3/UL
BASO+EOS+MONOS NFR BLD AUTO: NORMAL %
BASOPHILS # BLD AUTO: 0.1 10E3/UL (ref 0–0.2)
BASOPHILS NFR BLD AUTO: 1 %
BUN SERPL-MCNC: 16 MG/DL (ref 6–20)
CALCIUM SERPL-MCNC: 8.9 MG/DL (ref 8.6–10)
CHLORIDE SERPL-SCNC: 105 MMOL/L (ref 98–107)
CREAT SERPL-MCNC: 0.68 MG/DL (ref 0.51–0.95)
DEPRECATED HCO3 PLAS-SCNC: 26 MMOL/L (ref 22–29)
EGFRCR SERPLBLD CKD-EPI 2021: >90 ML/MIN/1.73M2
EOSINOPHIL # BLD AUTO: 0.2 10E3/UL (ref 0–0.7)
EOSINOPHIL NFR BLD AUTO: 1 %
ERYTHROCYTE [DISTWIDTH] IN BLOOD BY AUTOMATED COUNT: 12.8 % (ref 10–15)
GLUCOSE SERPL-MCNC: 114 MG/DL (ref 70–99)
HCT VFR BLD AUTO: 37.6 % (ref 35–47)
HGB BLD-MCNC: 13.2 G/DL (ref 11.7–15.7)
IMM GRANULOCYTES # BLD: 0.1 10E3/UL
IMM GRANULOCYTES NFR BLD: 1 %
LYMPHOCYTES # BLD AUTO: 2.2 10E3/UL (ref 0.8–5.3)
LYMPHOCYTES NFR BLD AUTO: 21 %
MCH RBC QN AUTO: 30.6 PG (ref 26.5–33)
MCHC RBC AUTO-ENTMCNC: 35.1 G/DL (ref 31.5–36.5)
MCV RBC AUTO: 87 FL (ref 78–100)
MONOCYTES # BLD AUTO: 1.1 10E3/UL (ref 0–1.3)
MONOCYTES NFR BLD AUTO: 10 %
NEUTROPHILS # BLD AUTO: 7.3 10E3/UL (ref 1.6–8.3)
NEUTROPHILS NFR BLD AUTO: 66 %
NRBC # BLD AUTO: 0 10E3/UL
NRBC BLD AUTO-RTO: 0 /100
PLATELET # BLD AUTO: 185 10E3/UL (ref 150–450)
POTASSIUM SERPL-SCNC: 3.7 MMOL/L (ref 3.4–5.3)
RBC # BLD AUTO: 4.32 10E6/UL (ref 3.8–5.2)
SODIUM SERPL-SCNC: 140 MMOL/L (ref 135–145)
TROPONIN T SERPL HS-MCNC: <6 NG/L
WBC # BLD AUTO: 10.9 10E3/UL (ref 4–11)

## 2023-10-15 PROCEDURE — 36415 COLL VENOUS BLD VENIPUNCTURE: CPT | Performed by: EMERGENCY MEDICINE

## 2023-10-15 PROCEDURE — 93005 ELECTROCARDIOGRAM TRACING: CPT

## 2023-10-15 PROCEDURE — 82310 ASSAY OF CALCIUM: CPT | Performed by: EMERGENCY MEDICINE

## 2023-10-15 PROCEDURE — 99285 EMERGENCY DEPT VISIT HI MDM: CPT | Mod: 25

## 2023-10-15 PROCEDURE — 84484 ASSAY OF TROPONIN QUANT: CPT | Performed by: EMERGENCY MEDICINE

## 2023-10-15 PROCEDURE — 71046 X-RAY EXAM CHEST 2 VIEWS: CPT

## 2023-10-15 PROCEDURE — 85025 COMPLETE CBC W/AUTO DIFF WBC: CPT | Performed by: EMERGENCY MEDICINE

## 2023-10-15 NOTE — ED TRIAGE NOTES
Pt presents with L sided CP that is dull along with L sided jaw and arm pain. Pt reports this is intermittent x 3 wks. Pt thought it was muscular in nature but now has sharp twinges that are not going away. Pt endorses some anxiety. Pt endorses hx of negative cardiac workup including stress echos/holter monitor. A & Ox4.      Triage Assessment (Adult)       Row Name 10/15/23 0123          Triage Assessment    Airway WDL WDL        Respiratory WDL    Respiratory WDL WDL        Skin Circulation/Temperature WDL    Skin Circulation/Temperature WDL WDL        Cardiac WDL    Cardiac WDL X;chest pain  L sided into jaw and L arm        Peripheral/Neurovascular WDL    Peripheral Neurovascular WDL WDL        Cognitive/Neuro/Behavioral WDL    Cognitive/Neuro/Behavioral WDL WDL

## 2023-10-15 NOTE — ED PROVIDER NOTES
"  History     Chief Complaint:  Chest Pain, Jaw Pain, and Arm Pain       HPI   Di Garcia is a 47 year old female who presents with pain radiating between left jaw and left shoulder. Patient began to have left sided chest pain that is worse with breathing and radiated to her left jaw and shoulder.  She suspects some sort of rib injury was seen and had a \"adjustment.\"  Briefly, the left chest pain resolved, but she still had pain moving between her left jaw and shoulder, now the chest pain is recurrent. No position makes this pain any better. She denies fever, cough, nausea, vomiting, and abdominal pain. Patient notes having been stressed lately and history of panic attacks though she is not on any daily anxiety medications.       Independent Historian:   None - Patient Only    Review of External Notes:   Reviewed 7/12/23 office visit       Medications:    Zestril  Epinephrine  Lexapro     Past Medical History:    HELLP syndrome  Bilateral ovarian cysts   Gallbladder polyp     Past Surgical History:    [place] tooth extraction    D&C     Physical Exam   Patient Vitals for the past 24 hrs:   BP Temp Temp src Pulse Resp SpO2 Weight   10/15/23 0149 135/82 -- -- 80 -- 97 % --   10/15/23 0145 135/82 -- -- 80 18 98 % --   10/15/23 0122 (!) 155/120 97.5  F (36.4  C) Temporal 94 18 98 % 83.8 kg (184 lb 11.9 oz)        Physical Exam  Constitutional: Alert, attentive  HENT:    Nose: Nose normal.   Eyes: EOM are normal.   CV: regular rate and rhythm; no murmurs, rubs or gallups  Chest: Effort normal and breath sounds normal.   GI:  There is no tenderness. No distension. Normal bowel sounds  MSK: Normal range of motion.   Neurological: Alert, attentive  Skin: Skin is warm and dry.      Emergency Department Course   ECG  ECG results from 10/15/23   EKG 12 lead     Value    Systolic Blood Pressure     Diastolic Blood Pressure     Ventricular Rate 88    Atrial Rate 88    MI Interval 144    QRS Duration 86        QTc " 464    P Axis 49    R AXIS -10    T Axis 32    Interpretation ECG      Sinus rhythm  Normal ECG  When compared with ECG of 10-AUG-2022 14:29,  Premature atrial complexes are no longer Present          Imaging:  XR Chest 2 Views   Final Result   IMPRESSION: Negative chest, unchanged.             Laboratory:  Labs Ordered and Resulted from Time of ED Arrival to Time of ED Departure   BASIC METABOLIC PANEL - Abnormal       Result Value    Sodium 140      Potassium 3.7      Chloride 105      Carbon Dioxide (CO2) 26      Anion Gap 9      Urea Nitrogen 16.0      Creatinine 0.68      GFR Estimate >90      Calcium 8.9      Glucose 114 (*)    TROPONIN T, HIGH SENSITIVITY - Normal    Troponin T, High Sensitivity <6     CBC WITH PLATELETS AND DIFFERENTIAL    WBC Count 10.9      RBC Count 4.32      Hemoglobin 13.2      Hematocrit 37.6      MCV 87      MCH 30.6      MCHC 35.1      RDW 12.8      Platelet Count 185      % Neutrophils 66      % Lymphocytes 21      % Monocytes 10      Mids % (Monos, Eos, Basos)        % Eosinophils 1      % Basophils 1      % Immature Granulocytes 1      NRBCs per 100 WBC 0      Absolute Neutrophils 7.3      Absolute Lymphocytes 2.2      Absolute Monocytes 1.1      Mids Abs (Monos, Eos, Basos)        Absolute Eosinophils 0.2      Absolute Basophils 0.1      Absolute Immature Granulocytes 0.1      Absolute NRBCs 0.0          Procedures       Emergency Department Course & Assessments:  Interventions:  Medications - No data to display     Assessments:  0135 I obtained history and examined the patient as noted above.   0220 I rechecked the patient and explained findings.     Independent Interpretation (X-rays, CTs, rhythm strip):  No pneumothorax on chest x-ray      Disposition:  The patient was discharged to home.     Impression & Plan    Medical Decision Making:  This is a pleasant 47-year-old female who presents for evaluation of approximately 3 weeks of persistent, lancinating pain to the left jaw,  left chest, left shoulder.  Symptoms occur at rest and are otherwise not provoked by exertion, motion, inspiration, etc.  Given longstanding atypical symptoms, her negative EKG and undetectable high-sensitivity troponin essentially rule out out AMI.  She is PERC negative, essentially ruling out PE.  Chest x-ray shows no widened mediastinum, pneumothorax, or pneumonia.  I emphasized the unclear nature of her symptoms and reinforced the importance of primary care follow-up.  Based on her low heart score, she is very reasonable for outpatient follow-up.  Primary care follow-up in 3 to 5 days and return precaution for worse pain, shortness of breath, or any other concerns.      Diagnosis:    ICD-10-CM    1. Chest pain, unspecified type  R07.9             Scribe Disclosure:  I, Jose Isaacs, am serving as a scribe at 1:43 AM on 10/15/2023 to document services personally performed by Ramesh Clay MD based on my observations and the provider's statements to me.   10/15/2023   Ramesh Clay MD [place], John Eric, MD  10/15/23 0229       Ramesh Clay MD  10/15/23 023

## 2023-10-16 LAB
ATRIAL RATE - MUSE: 88 BPM
DIASTOLIC BLOOD PRESSURE - MUSE: NORMAL MMHG
INTERPRETATION ECG - MUSE: NORMAL
P AXIS - MUSE: 49 DEGREES
PR INTERVAL - MUSE: 144 MS
QRS DURATION - MUSE: 86 MS
QT - MUSE: 384 MS
QTC - MUSE: 464 MS
R AXIS - MUSE: -10 DEGREES
SYSTOLIC BLOOD PRESSURE - MUSE: NORMAL MMHG
T AXIS - MUSE: 32 DEGREES
VENTRICULAR RATE- MUSE: 88 BPM

## 2024-05-26 ENCOUNTER — HEALTH MAINTENANCE LETTER (OUTPATIENT)
Age: 49
End: 2024-05-26

## 2025-06-14 ENCOUNTER — HEALTH MAINTENANCE LETTER (OUTPATIENT)
Age: 50
End: 2025-06-14

## 2025-08-20 ENCOUNTER — HOSPITAL ENCOUNTER (EMERGENCY)
Facility: CLINIC | Age: 50
Discharge: HOME | End: 2025-08-20
Attending: EMERGENCY MEDICINE
Payer: COMMERCIAL

## 2025-08-20 ENCOUNTER — APPOINTMENT (OUTPATIENT)
Dept: CT IMAGING | Facility: CLINIC | Age: 50
End: 2025-08-20
Attending: EMERGENCY MEDICINE
Payer: COMMERCIAL

## 2025-08-20 VITALS
OXYGEN SATURATION: 99 % | DIASTOLIC BLOOD PRESSURE: 97 MMHG | TEMPERATURE: 97.7 F | RESPIRATION RATE: 20 BRPM | SYSTOLIC BLOOD PRESSURE: 140 MMHG | BODY MASS INDEX: 33.94 KG/M2 | HEART RATE: 74 BPM | WEIGHT: 197.75 LBS

## 2025-08-20 DIAGNOSIS — R10.9 NONSPECIFIC ABDOMINAL PAIN: Primary | ICD-10-CM

## 2025-08-20 DIAGNOSIS — K80.20 GALLSTONES: ICD-10-CM

## 2025-08-20 DIAGNOSIS — K57.30 DIVERTICULOSIS OF LARGE INTESTINE WITHOUT HEMORRHAGE: ICD-10-CM

## 2025-08-20 DIAGNOSIS — K76.0 HEPATIC STEATOSIS: ICD-10-CM

## 2025-08-20 LAB
ANION GAP SERPL CALCULATED.3IONS-SCNC: 13 MMOL/L (ref 7–15)
BASOPHILS # BLD AUTO: 0.06 10E3/UL (ref 0–0.2)
BASOPHILS NFR BLD AUTO: 0.8 %
BUN SERPL-MCNC: 11.4 MG/DL (ref 6–20)
CALCIUM SERPL-MCNC: 9.1 MG/DL (ref 8.8–10.4)
CHLORIDE SERPL-SCNC: 103 MMOL/L (ref 98–107)
CREAT SERPL-MCNC: 0.59 MG/DL (ref 0.51–0.95)
EGFRCR SERPLBLD CKD-EPI 2021: >90 ML/MIN/1.73M2
EOSINOPHIL # BLD AUTO: 0.11 10E3/UL (ref 0–0.7)
EOSINOPHIL NFR BLD AUTO: 1.5 %
ERYTHROCYTE [DISTWIDTH] IN BLOOD BY AUTOMATED COUNT: 12.9 % (ref 10–15)
GLUCOSE SERPL-MCNC: 105 MG/DL (ref 70–99)
HCO3 SERPL-SCNC: 23 MMOL/L (ref 22–29)
HCT VFR BLD AUTO: 40.4 % (ref 35–47)
HGB BLD-MCNC: 14.3 G/DL (ref 11.7–15.7)
IMM GRANULOCYTES # BLD: 0.04 10E3/UL
IMM GRANULOCYTES NFR BLD: 0.5 %
LYMPHOCYTES # BLD AUTO: 2.51 10E3/UL (ref 0.8–5.3)
LYMPHOCYTES NFR BLD AUTO: 34.2 %
MCH RBC QN AUTO: 29.9 PG (ref 26.5–33)
MCHC RBC AUTO-ENTMCNC: 35.4 G/DL (ref 31.5–36.5)
MCV RBC AUTO: 84.3 FL (ref 78–100)
MONOCYTES # BLD AUTO: 0.62 10E3/UL (ref 0–1.3)
MONOCYTES NFR BLD AUTO: 8.4 %
NEUTROPHILS # BLD AUTO: 4 10E3/UL (ref 1.6–8.3)
NEUTROPHILS NFR BLD AUTO: 54.6 %
NRBC # BLD AUTO: <0.03 10E3/UL
NRBC BLD AUTO-RTO: 0 /100
PLATELET # BLD AUTO: 243 10E3/UL (ref 150–450)
POTASSIUM SERPL-SCNC: 4 MMOL/L (ref 3.4–5.3)
RBC # BLD AUTO: 4.79 10E6/UL (ref 3.8–5.2)
SODIUM SERPL-SCNC: 139 MMOL/L (ref 135–145)
WBC # BLD AUTO: 7.34 10E3/UL (ref 4–11)

## 2025-08-20 PROCEDURE — 80048 BASIC METABOLIC PNL TOTAL CA: CPT | Performed by: EMERGENCY MEDICINE

## 2025-08-20 PROCEDURE — 85004 AUTOMATED DIFF WBC COUNT: CPT | Performed by: EMERGENCY MEDICINE

## 2025-08-20 PROCEDURE — 99284 EMERGENCY DEPT VISIT MOD MDM: CPT | Mod: 25 | Performed by: EMERGENCY MEDICINE

## 2025-08-20 PROCEDURE — 74176 CT ABD & PELVIS W/O CONTRAST: CPT

## 2025-08-20 PROCEDURE — 36415 COLL VENOUS BLD VENIPUNCTURE: CPT | Performed by: EMERGENCY MEDICINE

## 2025-08-20 ASSESSMENT — COLUMBIA-SUICIDE SEVERITY RATING SCALE - C-SSRS
1. IN THE PAST MONTH, HAVE YOU WISHED YOU WERE DEAD OR WISHED YOU COULD GO TO SLEEP AND NOT WAKE UP?: NO
1. IN THE PAST MONTH, HAVE YOU WISHED YOU WERE DEAD OR WISHED YOU COULD GO TO SLEEP AND NOT WAKE UP?: NO
2. HAVE YOU ACTUALLY HAD ANY THOUGHTS OF KILLING YOURSELF IN THE PAST MONTH?: NO
6. HAVE YOU EVER DONE ANYTHING, STARTED TO DO ANYTHING, OR PREPARED TO DO ANYTHING TO END YOUR LIFE?: NO
6. HAVE YOU EVER DONE ANYTHING, STARTED TO DO ANYTHING, OR PREPARED TO DO ANYTHING TO END YOUR LIFE?: NO
2. HAVE YOU ACTUALLY HAD ANY THOUGHTS OF KILLING YOURSELF IN THE PAST MONTH?: NO

## 2025-08-20 ASSESSMENT — ACTIVITIES OF DAILY LIVING (ADL)
ADLS_ACUITY_SCORE: 41